# Patient Record
Sex: FEMALE | Race: OTHER | HISPANIC OR LATINO | Employment: UNEMPLOYED | URBAN - METROPOLITAN AREA
[De-identification: names, ages, dates, MRNs, and addresses within clinical notes are randomized per-mention and may not be internally consistent; named-entity substitution may affect disease eponyms.]

---

## 2021-09-16 ENCOUNTER — OFFICE VISIT (OUTPATIENT)
Dept: FAMILY MEDICINE | Facility: CLINIC | Age: 36
End: 2021-09-16
Payer: OTHER GOVERNMENT

## 2021-09-16 ENCOUNTER — TELEPHONE (OUTPATIENT)
Dept: FAMILY MEDICINE | Facility: CLINIC | Age: 36
End: 2021-09-16

## 2021-09-16 VITALS
BODY MASS INDEX: 23.31 KG/M2 | WEIGHT: 145.06 LBS | HEART RATE: 70 BPM | TEMPERATURE: 98 F | HEIGHT: 66 IN | SYSTOLIC BLOOD PRESSURE: 118 MMHG | OXYGEN SATURATION: 95 % | RESPIRATION RATE: 18 BRPM | DIASTOLIC BLOOD PRESSURE: 72 MMHG

## 2021-09-16 DIAGNOSIS — N96 RECURRENT PREGNANCY LOSS: Primary | ICD-10-CM

## 2021-09-16 DIAGNOSIS — Z31.41 FERTILITY TESTING: ICD-10-CM

## 2021-09-16 PROCEDURE — 99999 PR PBB SHADOW E&M-NEW PATIENT-LVL IV: CPT | Mod: PBBFAC,,, | Performed by: FAMILY MEDICINE

## 2021-09-16 PROCEDURE — 99204 OFFICE O/P NEW MOD 45 MIN: CPT | Mod: PBBFAC,PN | Performed by: FAMILY MEDICINE

## 2021-09-16 PROCEDURE — 99203 OFFICE O/P NEW LOW 30 MIN: CPT | Mod: S$PBB,,, | Performed by: FAMILY MEDICINE

## 2021-09-16 PROCEDURE — 99999 PR PBB SHADOW E&M-NEW PATIENT-LVL IV: ICD-10-PCS | Mod: PBBFAC,,, | Performed by: FAMILY MEDICINE

## 2021-09-16 PROCEDURE — 99203 PR OFFICE/OUTPT VISIT, NEW, LEVL III, 30-44 MIN: ICD-10-PCS | Mod: S$PBB,,, | Performed by: FAMILY MEDICINE

## 2021-10-14 ENCOUNTER — OFFICE VISIT (OUTPATIENT)
Dept: FAMILY MEDICINE | Facility: CLINIC | Age: 36
End: 2021-10-14
Payer: OTHER GOVERNMENT

## 2021-10-14 VITALS
TEMPERATURE: 98 F | BODY MASS INDEX: 23.2 KG/M2 | DIASTOLIC BLOOD PRESSURE: 62 MMHG | SYSTOLIC BLOOD PRESSURE: 112 MMHG | OXYGEN SATURATION: 96 % | WEIGHT: 144.38 LBS | RESPIRATION RATE: 18 BRPM | HEIGHT: 66 IN | HEART RATE: 63 BPM

## 2021-10-14 DIAGNOSIS — Z31.83 IN VITRO FERTILIZATION: ICD-10-CM

## 2021-10-14 DIAGNOSIS — Z13.220 ENCOUNTER FOR LIPID SCREENING FOR CARDIOVASCULAR DISEASE: ICD-10-CM

## 2021-10-14 DIAGNOSIS — Z11.4 ENCOUNTER FOR SCREENING FOR HIV: ICD-10-CM

## 2021-10-14 DIAGNOSIS — Z11.59 NEED FOR HEPATITIS C SCREENING TEST: ICD-10-CM

## 2021-10-14 DIAGNOSIS — Z13.6 ENCOUNTER FOR LIPID SCREENING FOR CARDIOVASCULAR DISEASE: ICD-10-CM

## 2021-10-14 DIAGNOSIS — N97.8 FEMALE INFERTILITY OF OTHER ORIGIN: Primary | ICD-10-CM

## 2021-10-14 PROCEDURE — 99213 OFFICE O/P EST LOW 20 MIN: CPT | Mod: S$PBB,,, | Performed by: FAMILY MEDICINE

## 2021-10-14 PROCEDURE — 99999 PR PBB SHADOW E&M-EST. PATIENT-LVL III: ICD-10-PCS | Mod: PBBFAC,,, | Performed by: FAMILY MEDICINE

## 2021-10-14 PROCEDURE — 99213 PR OFFICE/OUTPT VISIT, EST, LEVL III, 20-29 MIN: ICD-10-PCS | Mod: S$PBB,,, | Performed by: FAMILY MEDICINE

## 2021-10-14 PROCEDURE — 99999 PR PBB SHADOW E&M-EST. PATIENT-LVL III: CPT | Mod: PBBFAC,,, | Performed by: FAMILY MEDICINE

## 2021-10-14 PROCEDURE — 99213 OFFICE O/P EST LOW 20 MIN: CPT | Mod: PBBFAC,PN | Performed by: FAMILY MEDICINE

## 2021-10-17 RX ORDER — DESOGESTREL AND ETHINYL ESTRADIOL 0.15-0.03
1 KIT ORAL DAILY
COMMUNITY
Start: 2021-07-27 | End: 2023-01-30

## 2021-10-17 RX ORDER — LEUPROLIDE ACETATE 1 MG/0.2ML
KIT SUBCUTANEOUS
COMMUNITY
Start: 2021-08-17 | End: 2023-01-30

## 2021-10-17 RX ORDER — CABERGOLINE 0.5 MG/1
0.5 TABLET ORAL NIGHTLY
COMMUNITY
Start: 2021-08-30 | End: 2023-01-30

## 2021-10-17 RX ORDER — CETRORELIX ACETATE 0.25 MG
KIT SUBCUTANEOUS
COMMUNITY
Start: 2021-08-17 | End: 2023-01-30

## 2021-10-17 RX ORDER — CHORIONIC GONADOTROPIN 10000 UNIT
KIT INTRAMUSCULAR
COMMUNITY
Start: 2021-08-17 | End: 2023-01-30

## 2021-10-17 RX ORDER — METFORMIN HYDROCHLORIDE 500 MG/1
500 TABLET, EXTENDED RELEASE ORAL 3 TIMES DAILY
COMMUNITY
Start: 2021-07-08 | End: 2022-03-15

## 2021-10-18 ENCOUNTER — LAB VISIT (OUTPATIENT)
Dept: LAB | Facility: HOSPITAL | Age: 36
End: 2021-10-18
Attending: FAMILY MEDICINE
Payer: OTHER GOVERNMENT

## 2021-10-18 DIAGNOSIS — N97.8 FEMALE INFERTILITY OF OTHER ORIGIN: ICD-10-CM

## 2021-10-18 DIAGNOSIS — Z13.6 ENCOUNTER FOR LIPID SCREENING FOR CARDIOVASCULAR DISEASE: ICD-10-CM

## 2021-10-18 DIAGNOSIS — Z11.59 NEED FOR HEPATITIS C SCREENING TEST: ICD-10-CM

## 2021-10-18 DIAGNOSIS — Z13.220 ENCOUNTER FOR LIPID SCREENING FOR CARDIOVASCULAR DISEASE: ICD-10-CM

## 2021-10-18 DIAGNOSIS — Z11.4 ENCOUNTER FOR SCREENING FOR HIV: ICD-10-CM

## 2021-10-18 DIAGNOSIS — Z31.83 IN VITRO FERTILIZATION: ICD-10-CM

## 2021-10-18 LAB
ALBUMIN SERPL BCP-MCNC: 3.7 G/DL (ref 3.5–5.2)
ALP SERPL-CCNC: 104 U/L (ref 55–135)
ALT SERPL W/O P-5'-P-CCNC: 43 U/L (ref 10–44)
ANION GAP SERPL CALC-SCNC: 10 MMOL/L (ref 8–16)
AST SERPL-CCNC: 18 U/L (ref 10–40)
BILIRUB SERPL-MCNC: 0.5 MG/DL (ref 0.1–1)
BUN SERPL-MCNC: 6 MG/DL (ref 6–20)
CALCIUM SERPL-MCNC: 9.5 MG/DL (ref 8.7–10.5)
CHLORIDE SERPL-SCNC: 105 MMOL/L (ref 95–110)
CHOLEST SERPL-MCNC: 195 MG/DL (ref 120–199)
CHOLEST/HDLC SERPL: 3.8 {RATIO} (ref 2–5)
CO2 SERPL-SCNC: 25 MMOL/L (ref 23–29)
CREAT SERPL-MCNC: 0.7 MG/DL (ref 0.5–1.4)
EST. GFR  (AFRICAN AMERICAN): >60 ML/MIN/1.73 M^2
EST. GFR  (NON AFRICAN AMERICAN): >60 ML/MIN/1.73 M^2
GLUCOSE SERPL-MCNC: 130 MG/DL (ref 70–110)
HCG INTACT+B SERPL-ACNC: <1.2 MIU/ML
HDLC SERPL-MCNC: 52 MG/DL (ref 40–75)
HDLC SERPL: 26.7 % (ref 20–50)
LDLC SERPL CALC-MCNC: 119.2 MG/DL (ref 63–159)
NONHDLC SERPL-MCNC: 143 MG/DL
POTASSIUM SERPL-SCNC: 3.8 MMOL/L (ref 3.5–5.1)
PROT SERPL-MCNC: 7.3 G/DL (ref 6–8.4)
SODIUM SERPL-SCNC: 140 MMOL/L (ref 136–145)
TRIGL SERPL-MCNC: 119 MG/DL (ref 30–150)

## 2021-10-18 PROCEDURE — 87389 HIV-1 AG W/HIV-1&-2 AB AG IA: CPT | Performed by: FAMILY MEDICINE

## 2021-10-18 PROCEDURE — 80053 COMPREHEN METABOLIC PANEL: CPT | Performed by: FAMILY MEDICINE

## 2021-10-18 PROCEDURE — 36415 COLL VENOUS BLD VENIPUNCTURE: CPT | Mod: PN | Performed by: FAMILY MEDICINE

## 2021-10-18 PROCEDURE — 86803 HEPATITIS C AB TEST: CPT | Performed by: FAMILY MEDICINE

## 2021-10-18 PROCEDURE — 84144 ASSAY OF PROGESTERONE: CPT | Performed by: FAMILY MEDICINE

## 2021-10-18 PROCEDURE — 84702 CHORIONIC GONADOTROPIN TEST: CPT | Performed by: FAMILY MEDICINE

## 2021-10-18 PROCEDURE — 80061 LIPID PANEL: CPT | Performed by: FAMILY MEDICINE

## 2021-10-19 LAB
HCV AB SERPL QL IA: NEGATIVE
HIV 1+2 AB+HIV1 P24 AG SERPL QL IA: NEGATIVE
PROGEST SERPL-MCNC: 0.2 NG/ML

## 2021-10-20 ENCOUNTER — CLINICAL SUPPORT (OUTPATIENT)
Dept: URGENT CARE | Facility: CLINIC | Age: 36
End: 2021-10-20
Payer: OTHER GOVERNMENT

## 2021-10-20 DIAGNOSIS — Z11.9 ENCOUNTER FOR SCREENING EXAMINATION FOR INFECTIOUS DISEASE: Primary | ICD-10-CM

## 2021-10-20 LAB
CTP QC/QA: YES
SARS-COV-2 RDRP RESP QL NAA+PROBE: NEGATIVE

## 2021-10-20 PROCEDURE — 99211 PR OFFICE/OUTPT VISIT, EST, LEVL I: ICD-10-PCS | Mod: S$GLB,CS,, | Performed by: FAMILY MEDICINE

## 2021-10-20 PROCEDURE — U0002 COVID-19 LAB TEST NON-CDC: HCPCS | Mod: QW,S$GLB,, | Performed by: FAMILY MEDICINE

## 2021-10-20 PROCEDURE — 99211 OFF/OP EST MAY X REQ PHY/QHP: CPT | Mod: S$GLB,CS,, | Performed by: FAMILY MEDICINE

## 2021-10-20 PROCEDURE — U0002: ICD-10-PCS | Mod: QW,S$GLB,, | Performed by: FAMILY MEDICINE

## 2021-12-15 ENCOUNTER — LAB VISIT (OUTPATIENT)
Dept: LAB | Facility: HOSPITAL | Age: 36
End: 2021-12-15
Attending: NURSE PRACTITIONER
Payer: OTHER GOVERNMENT

## 2021-12-15 ENCOUNTER — OFFICE VISIT (OUTPATIENT)
Dept: FAMILY MEDICINE | Facility: CLINIC | Age: 36
End: 2021-12-15
Payer: OTHER GOVERNMENT

## 2021-12-15 VITALS
RESPIRATION RATE: 17 BRPM | SYSTOLIC BLOOD PRESSURE: 110 MMHG | BODY MASS INDEX: 24.27 KG/M2 | TEMPERATURE: 98 F | HEART RATE: 78 BPM | HEIGHT: 66 IN | OXYGEN SATURATION: 96 % | WEIGHT: 151 LBS | DIASTOLIC BLOOD PRESSURE: 76 MMHG

## 2021-12-15 DIAGNOSIS — Z31.83 IN VITRO FERTILIZATION: ICD-10-CM

## 2021-12-15 DIAGNOSIS — N97.8 FEMALE INFERTILITY OF OTHER ORIGIN: ICD-10-CM

## 2021-12-15 DIAGNOSIS — Z23 NEEDS FLU SHOT: ICD-10-CM

## 2021-12-15 DIAGNOSIS — N97.8 FEMALE INFERTILITY OF OTHER ORIGIN: Primary | ICD-10-CM

## 2021-12-15 LAB
HCG INTACT+B SERPL-ACNC: <1.2 MIU/ML
PROGEST SERPL-MCNC: 0.2 NG/ML

## 2021-12-15 PROCEDURE — 99213 OFFICE O/P EST LOW 20 MIN: CPT | Mod: 25,PBBFAC,PN | Performed by: NURSE PRACTITIONER

## 2021-12-15 PROCEDURE — 36415 COLL VENOUS BLD VENIPUNCTURE: CPT | Mod: PN | Performed by: NURSE PRACTITIONER

## 2021-12-15 PROCEDURE — 99213 OFFICE O/P EST LOW 20 MIN: CPT | Mod: S$PBB,,, | Performed by: NURSE PRACTITIONER

## 2021-12-15 PROCEDURE — 84702 CHORIONIC GONADOTROPIN TEST: CPT | Performed by: NURSE PRACTITIONER

## 2021-12-15 PROCEDURE — 99999 PR PBB SHADOW E&M-EST. PATIENT-LVL III: CPT | Mod: PBBFAC,,, | Performed by: NURSE PRACTITIONER

## 2021-12-15 PROCEDURE — 99999 PR PBB SHADOW E&M-EST. PATIENT-LVL III: ICD-10-PCS | Mod: PBBFAC,,, | Performed by: NURSE PRACTITIONER

## 2021-12-15 PROCEDURE — 90471 IMMUNIZATION ADMIN: CPT | Mod: PBBFAC,PN

## 2021-12-15 PROCEDURE — 99213 PR OFFICE/OUTPT VISIT, EST, LEVL III, 20-29 MIN: ICD-10-PCS | Mod: S$PBB,,, | Performed by: NURSE PRACTITIONER

## 2021-12-15 PROCEDURE — 84144 ASSAY OF PROGESTERONE: CPT | Performed by: NURSE PRACTITIONER

## 2021-12-17 ENCOUNTER — OFFICE VISIT (OUTPATIENT)
Dept: OBSTETRICS AND GYNECOLOGY | Facility: CLINIC | Age: 36
End: 2021-12-17
Payer: OTHER GOVERNMENT

## 2021-12-17 ENCOUNTER — PATIENT OUTREACH (OUTPATIENT)
Dept: ADMINISTRATIVE | Facility: OTHER | Age: 36
End: 2021-12-17
Payer: OTHER GOVERNMENT

## 2021-12-17 VITALS
SYSTOLIC BLOOD PRESSURE: 112 MMHG | BODY MASS INDEX: 24.48 KG/M2 | WEIGHT: 152.31 LBS | HEIGHT: 66 IN | DIASTOLIC BLOOD PRESSURE: 72 MMHG

## 2021-12-17 DIAGNOSIS — N97.9 FEMALE INFERTILITY: Primary | ICD-10-CM

## 2021-12-17 PROCEDURE — 99999 PR PBB SHADOW E&M-EST. PATIENT-LVL III: CPT | Mod: PBBFAC,,, | Performed by: OBSTETRICS & GYNECOLOGY

## 2021-12-17 PROCEDURE — 99202 PR OFFICE/OUTPT VISIT, NEW, LEVL II, 15-29 MIN: ICD-10-PCS | Mod: S$PBB,,, | Performed by: OBSTETRICS & GYNECOLOGY

## 2021-12-17 PROCEDURE — 99999 PR PBB SHADOW E&M-EST. PATIENT-LVL III: ICD-10-PCS | Mod: PBBFAC,,, | Performed by: OBSTETRICS & GYNECOLOGY

## 2021-12-17 PROCEDURE — 99213 OFFICE O/P EST LOW 20 MIN: CPT | Mod: PBBFAC | Performed by: OBSTETRICS & GYNECOLOGY

## 2021-12-17 PROCEDURE — 99202 OFFICE O/P NEW SF 15 MIN: CPT | Mod: S$PBB,,, | Performed by: OBSTETRICS & GYNECOLOGY

## 2021-12-29 ENCOUNTER — HOSPITAL ENCOUNTER (OUTPATIENT)
Dept: RADIOLOGY | Facility: HOSPITAL | Age: 36
Discharge: HOME | End: 2021-12-29
Attending: OBSTETRICS & GYNECOLOGY
Payer: OTHER GOVERNMENT

## 2021-12-29 DIAGNOSIS — N97.9 FEMALE INFERTILITY: ICD-10-CM

## 2021-12-29 PROCEDURE — 76856 US EXAM PELVIC COMPLETE: CPT | Mod: TC

## 2021-12-29 PROCEDURE — 76830 US PELVIS COMP WITH TRANSVAG NON-OB (XPD): ICD-10-PCS | Mod: 26,,, | Performed by: RADIOLOGY

## 2021-12-29 PROCEDURE — 76830 TRANSVAGINAL US NON-OB: CPT | Mod: 26,,, | Performed by: RADIOLOGY

## 2021-12-29 PROCEDURE — 76856 US EXAM PELVIC COMPLETE: CPT | Mod: 26,,, | Performed by: RADIOLOGY

## 2021-12-29 PROCEDURE — 76856 US PELVIS COMP WITH TRANSVAG NON-OB (XPD): ICD-10-PCS | Mod: 26,,, | Performed by: RADIOLOGY

## 2021-12-30 ENCOUNTER — PATIENT MESSAGE (OUTPATIENT)
Dept: OBSTETRICS AND GYNECOLOGY | Facility: CLINIC | Age: 36
End: 2021-12-30
Payer: OTHER GOVERNMENT

## 2022-01-03 ENCOUNTER — TELEPHONE (OUTPATIENT)
Dept: OBSTETRICS AND GYNECOLOGY | Facility: CLINIC | Age: 37
End: 2022-01-03
Payer: OTHER GOVERNMENT

## 2022-01-03 DIAGNOSIS — N97.9 FEMALE INFERTILITY: Primary | ICD-10-CM

## 2022-01-07 ENCOUNTER — HOSPITAL ENCOUNTER (OUTPATIENT)
Dept: RADIOLOGY | Facility: HOSPITAL | Age: 37
Discharge: HOME | End: 2022-01-07
Attending: OBSTETRICS & GYNECOLOGY
Payer: OTHER GOVERNMENT

## 2022-01-07 DIAGNOSIS — N97.9 FEMALE INFERTILITY: ICD-10-CM

## 2022-01-07 PROCEDURE — 76830 US PELVIS COMP WITH TRANSVAG NON-OB (XPD): ICD-10-PCS | Mod: 26,,, | Performed by: RADIOLOGY

## 2022-01-07 PROCEDURE — 76856 US PELVIS COMP WITH TRANSVAG NON-OB (XPD): ICD-10-PCS | Mod: 26,,, | Performed by: RADIOLOGY

## 2022-01-07 PROCEDURE — 76830 TRANSVAGINAL US NON-OB: CPT | Mod: 26,,, | Performed by: RADIOLOGY

## 2022-01-07 PROCEDURE — 76856 US EXAM PELVIC COMPLETE: CPT | Mod: 26,,, | Performed by: RADIOLOGY

## 2022-01-07 PROCEDURE — 76830 TRANSVAGINAL US NON-OB: CPT | Mod: TC

## 2022-01-10 ENCOUNTER — TELEPHONE (OUTPATIENT)
Dept: OBSTETRICS AND GYNECOLOGY | Facility: CLINIC | Age: 37
End: 2022-01-10
Payer: OTHER GOVERNMENT

## 2022-01-10 DIAGNOSIS — N97.9 FEMALE INFERTILITY: Primary | ICD-10-CM

## 2022-01-20 ENCOUNTER — HOSPITAL ENCOUNTER (OUTPATIENT)
Dept: RADIOLOGY | Facility: HOSPITAL | Age: 37
Discharge: HOME | End: 2022-01-20
Attending: OBSTETRICS & GYNECOLOGY
Payer: OTHER GOVERNMENT

## 2022-01-20 DIAGNOSIS — N97.9 FEMALE INFERTILITY: ICD-10-CM

## 2022-01-20 PROCEDURE — 76856 US EXAM PELVIC COMPLETE: CPT | Mod: 26,,, | Performed by: RADIOLOGY

## 2022-01-20 PROCEDURE — 76830 TRANSVAGINAL US NON-OB: CPT | Mod: 26,,, | Performed by: RADIOLOGY

## 2022-01-20 PROCEDURE — 76830 TRANSVAGINAL US NON-OB: CPT | Mod: TC

## 2022-01-20 PROCEDURE — 76856 US PELVIS COMP WITH TRANSVAG NON-OB (XPD): ICD-10-PCS | Mod: 26,,, | Performed by: RADIOLOGY

## 2022-01-20 PROCEDURE — 76830 US PELVIS COMP WITH TRANSVAG NON-OB (XPD): ICD-10-PCS | Mod: 26,,, | Performed by: RADIOLOGY

## 2022-01-28 ENCOUNTER — TELEPHONE (OUTPATIENT)
Dept: OBSTETRICS AND GYNECOLOGY | Facility: CLINIC | Age: 37
End: 2022-01-28
Payer: OTHER GOVERNMENT

## 2022-01-28 DIAGNOSIS — N97.9 FEMALE INFERTILITY: Primary | ICD-10-CM

## 2022-02-01 ENCOUNTER — HOSPITAL ENCOUNTER (OUTPATIENT)
Dept: RADIOLOGY | Facility: HOSPITAL | Age: 37
Discharge: HOME | End: 2022-02-01
Attending: OBSTETRICS & GYNECOLOGY
Payer: OTHER GOVERNMENT

## 2022-02-01 DIAGNOSIS — N97.9 FEMALE INFERTILITY: ICD-10-CM

## 2022-02-01 PROCEDURE — 76830 TRANSVAGINAL US NON-OB: CPT | Mod: 26,,, | Performed by: RADIOLOGY

## 2022-02-01 PROCEDURE — 76830 TRANSVAGINAL US NON-OB: CPT | Mod: TC

## 2022-02-01 PROCEDURE — 76830 US PELVIS COMP WITH TRANSVAG NON-OB (XPD): ICD-10-PCS | Mod: 26,,, | Performed by: RADIOLOGY

## 2022-02-01 PROCEDURE — 76856 US EXAM PELVIC COMPLETE: CPT | Mod: 26,,, | Performed by: RADIOLOGY

## 2022-02-01 PROCEDURE — 76856 US PELVIS COMP WITH TRANSVAG NON-OB (XPD): ICD-10-PCS | Mod: 26,,, | Performed by: RADIOLOGY

## 2022-02-03 ENCOUNTER — TELEPHONE (OUTPATIENT)
Dept: OBSTETRICS AND GYNECOLOGY | Facility: HOSPITAL | Age: 37
End: 2022-02-03
Payer: OTHER GOVERNMENT

## 2022-02-03 DIAGNOSIS — N97.9 FEMALE INFERTILITY: Primary | ICD-10-CM

## 2022-02-03 NOTE — TELEPHONE ENCOUNTER
----- Message from Maria De Jesus Jesus MA sent at 2/3/2022  8:54 AM CST -----  Pt needs one more scan, she is sched for Monday. Please submit orders.  ----- Message -----  From: Mildred Scott  Sent: 2/3/2022   8:34 AM CST  To: [facility] Staff    Type: Patient Call Back       What is the request in detail:  pt calling to speak to a nurse regarding status on scan.      Can the clinic reply by [facility]? No       Would the patient rather a call back or a response via [facility]? Call back       Best call back number: [phone]        Thank you.

## 2022-02-07 ENCOUNTER — HOSPITAL ENCOUNTER (OUTPATIENT)
Dept: RADIOLOGY | Facility: OTHER | Age: 37
Discharge: HOME | End: 2022-02-07
Attending: OBSTETRICS & GYNECOLOGY
Payer: OTHER GOVERNMENT

## 2022-02-07 DIAGNOSIS — N97.9 FEMALE INFERTILITY: ICD-10-CM

## 2022-02-07 PROCEDURE — 76830 TRANSVAGINAL US NON-OB: CPT | Mod: TC

## 2022-02-07 PROCEDURE — 76856 US EXAM PELVIC COMPLETE: CPT | Mod: 26,,, | Performed by: STUDENT IN AN ORGANIZED HEALTH CARE EDUCATION/TRAINING PROGRAM

## 2022-02-07 PROCEDURE — 76830 TRANSVAGINAL US NON-OB: CPT | Mod: 26,,, | Performed by: STUDENT IN AN ORGANIZED HEALTH CARE EDUCATION/TRAINING PROGRAM

## 2022-02-07 PROCEDURE — 76830 US PELVIS COMP WITH TRANSVAG NON-OB (XPD): ICD-10-PCS | Mod: 26,,, | Performed by: STUDENT IN AN ORGANIZED HEALTH CARE EDUCATION/TRAINING PROGRAM

## 2022-02-07 PROCEDURE — 76856 US PELVIS COMP WITH TRANSVAG NON-OB (XPD): ICD-10-PCS | Mod: 26,,, | Performed by: STUDENT IN AN ORGANIZED HEALTH CARE EDUCATION/TRAINING PROGRAM

## 2022-02-08 ENCOUNTER — TELEPHONE (OUTPATIENT)
Dept: OBSTETRICS AND GYNECOLOGY | Facility: CLINIC | Age: 37
End: 2022-02-08
Payer: OTHER GOVERNMENT

## 2022-02-08 ENCOUNTER — LAB VISIT (OUTPATIENT)
Dept: LAB | Facility: HOSPITAL | Age: 37
End: 2022-02-08
Attending: OBSTETRICS & GYNECOLOGY
Payer: OTHER GOVERNMENT

## 2022-02-08 DIAGNOSIS — N97.9 FEMALE INFERTILITY: ICD-10-CM

## 2022-02-08 DIAGNOSIS — N97.9 FEMALE INFERTILITY: Primary | ICD-10-CM

## 2022-02-08 PROCEDURE — 84144 ASSAY OF PROGESTERONE: CPT | Performed by: OBSTETRICS & GYNECOLOGY

## 2022-02-08 PROCEDURE — 36415 COLL VENOUS BLD VENIPUNCTURE: CPT | Performed by: OBSTETRICS & GYNECOLOGY

## 2022-02-09 LAB — PROGEST SERPL-MCNC: 0.2 NG/ML

## 2022-02-16 ENCOUNTER — TELEPHONE (OUTPATIENT)
Dept: OBSTETRICS AND GYNECOLOGY | Facility: HOSPITAL | Age: 37
End: 2022-02-16
Payer: OTHER GOVERNMENT

## 2022-02-16 ENCOUNTER — PATIENT MESSAGE (OUTPATIENT)
Dept: OBSTETRICS AND GYNECOLOGY | Facility: CLINIC | Age: 37
End: 2022-02-16
Payer: OTHER GOVERNMENT

## 2022-02-16 DIAGNOSIS — Z32.00 POSSIBLE PREGNANCY: Primary | ICD-10-CM

## 2022-02-16 DIAGNOSIS — N97.9 FEMALE INFERTILITY: Primary | ICD-10-CM

## 2022-02-23 ENCOUNTER — LAB VISIT (OUTPATIENT)
Dept: LAB | Facility: HOSPITAL | Age: 37
End: 2022-02-23
Attending: OBSTETRICS & GYNECOLOGY
Payer: OTHER GOVERNMENT

## 2022-02-23 DIAGNOSIS — N97.9 FEMALE INFERTILITY: ICD-10-CM

## 2022-02-23 DIAGNOSIS — Z32.00 POSSIBLE PREGNANCY: ICD-10-CM

## 2022-02-23 LAB
ESTRADIOL SERPL-MCNC: 2534 PG/ML
HCG INTACT+B SERPL-ACNC: <1.2 MIU/ML
PROGEST SERPL-MCNC: 13.8 NG/ML

## 2022-02-23 PROCEDURE — 84702 CHORIONIC GONADOTROPIN TEST: CPT | Performed by: OBSTETRICS & GYNECOLOGY

## 2022-02-23 PROCEDURE — 84144 ASSAY OF PROGESTERONE: CPT | Performed by: OBSTETRICS & GYNECOLOGY

## 2022-02-23 PROCEDURE — 82670 ASSAY OF TOTAL ESTRADIOL: CPT | Performed by: OBSTETRICS & GYNECOLOGY

## 2022-02-23 PROCEDURE — 36415 COLL VENOUS BLD VENIPUNCTURE: CPT | Performed by: OBSTETRICS & GYNECOLOGY

## 2022-02-24 ENCOUNTER — PATIENT MESSAGE (OUTPATIENT)
Dept: OBSTETRICS AND GYNECOLOGY | Facility: CLINIC | Age: 37
End: 2022-02-24
Payer: OTHER GOVERNMENT

## 2022-03-14 ENCOUNTER — PATIENT OUTREACH (OUTPATIENT)
Dept: ADMINISTRATIVE | Facility: OTHER | Age: 37
End: 2022-03-14
Payer: OTHER GOVERNMENT

## 2022-03-15 ENCOUNTER — OFFICE VISIT (OUTPATIENT)
Dept: OBSTETRICS AND GYNECOLOGY | Facility: CLINIC | Age: 37
End: 2022-03-15
Payer: OTHER GOVERNMENT

## 2022-03-15 VITALS — WEIGHT: 144.5 LBS | BODY MASS INDEX: 23.32 KG/M2 | DIASTOLIC BLOOD PRESSURE: 74 MMHG | SYSTOLIC BLOOD PRESSURE: 118 MMHG

## 2022-03-15 DIAGNOSIS — E28.2 PCO (POLYCYSTIC OVARIES): Primary | ICD-10-CM

## 2022-03-15 DIAGNOSIS — N96 HISTORY OF MULTIPLE MISCARRIAGES: ICD-10-CM

## 2022-03-15 PROCEDURE — 99213 OFFICE O/P EST LOW 20 MIN: CPT | Mod: S$PBB,,, | Performed by: OBSTETRICS & GYNECOLOGY

## 2022-03-15 PROCEDURE — 99999 PR PBB SHADOW E&M-EST. PATIENT-LVL II: ICD-10-PCS | Mod: PBBFAC,,, | Performed by: OBSTETRICS & GYNECOLOGY

## 2022-03-15 PROCEDURE — 99213 PR OFFICE/OUTPT VISIT, EST, LEVL III, 20-29 MIN: ICD-10-PCS | Mod: S$PBB,,, | Performed by: OBSTETRICS & GYNECOLOGY

## 2022-03-15 PROCEDURE — 99999 PR PBB SHADOW E&M-EST. PATIENT-LVL II: CPT | Mod: PBBFAC,,, | Performed by: OBSTETRICS & GYNECOLOGY

## 2022-03-15 PROCEDURE — 99212 OFFICE O/P EST SF 10 MIN: CPT | Mod: PBBFAC | Performed by: OBSTETRICS & GYNECOLOGY

## 2022-03-15 RX ORDER — METFORMIN HYDROCHLORIDE 500 MG/1
1000 TABLET, EXTENDED RELEASE ORAL
Qty: 180 TABLET | Refills: 3 | Status: SHIPPED | OUTPATIENT
Start: 2022-03-15 | End: 2023-03-20 | Stop reason: SDUPTHER

## 2022-03-15 RX ORDER — LETROZOLE 2.5 MG/1
2.5 TABLET, FILM COATED ORAL DAILY
Qty: 5 TABLET | Refills: 3 | Status: SHIPPED | OUTPATIENT
Start: 2022-03-15 | End: 2022-12-29 | Stop reason: SDUPTHER

## 2022-03-19 NOTE — PROGRESS NOTES
Cc:  Discuss fertility    HPI:  36 yr  who recently underwent failed IVF in [place] presents to discuss other fertility options.  Pt has hx of irregular menses consistent with PCO.  Pt has one pregnancy to to approximately 22 wks, and the others were all first trimester SABs.  Pt is requesting to discuss either clomid or femara use.    Pt is not currently keeping menstrual calendar.  And for last several months, pt has been under care of [facility] in [place].    Vitals:    03/15/22 1603   BP: 118/74   Weight: 65.5 kg (144 lb 8.2 oz)     LMP 3/4/22    Physical Exam  Vitals reviewed.   Constitutional:       General: She is not in acute distress.     Appearance: She is normal weight. She is not ill-appearing, toxic-appearing or diaphoretic.   HENT:      Head: Normocephalic and atraumatic.   Pulmonary:      Effort: Pulmonary effort is normal. No respiratory distress.   Abdominal:      General: Abdomen is flat. There is no distension.      Palpations: There is no mass.   Musculoskeletal:         General: No swelling, tenderness, deformity or signs of injury. Normal range of motion.   Neurological:      General: No focal deficit present.      Mental Status: She is alert.      Cranial Nerves: No cranial nerve deficit.      Sensory: No sensory deficit.      Motor: No weakness.      Coordination: Coordination normal.   Psychiatric:         Mood and Affect: Mood normal.         Behavior: Behavior normal.         Thought Content: Thought content normal.           30 minute discussion  Discussed need to keep menstrual calendar.  If pt does not have spontaneous menses by 22, pt to call office for progesterone withdrawal bleed.  Discussed Femara is to be used CD3-7 after onset of next menses.  If pt is able to achieve pregnacy, pt would be started on progesterone and possibly lovenox.  Pt will also be restarted on metformin  mg qd.    Assessment/Plan  1. PCO (polycystic ovaries)  letrozole (FEMARA) 2.5 mg Tab    metFORMIN  (GLUCOPHAGE-XR) 500 MG ER 24hr tablet   2. History of multiple miscarriages

## 2022-04-12 ENCOUNTER — TELEPHONE (OUTPATIENT)
Dept: OBSTETRICS AND GYNECOLOGY | Facility: CLINIC | Age: 37
End: 2022-04-12
Payer: OTHER GOVERNMENT

## 2022-04-18 ENCOUNTER — TELEPHONE (OUTPATIENT)
Dept: OBSTETRICS AND GYNECOLOGY | Facility: CLINIC | Age: 37
End: 2022-04-18
Payer: OTHER GOVERNMENT

## 2022-04-18 DIAGNOSIS — N91.0 DELAYED MENSES: Primary | ICD-10-CM

## 2022-04-18 RX ORDER — MEDROXYPROGESTERONE ACETATE 10 MG/1
10 TABLET ORAL DAILY
Qty: 10 TABLET | Refills: 0 | Status: SHIPPED | OUTPATIENT
Start: 2022-04-18 | End: 2022-06-28 | Stop reason: SDUPTHER

## 2022-04-24 ENCOUNTER — PATIENT MESSAGE (OUTPATIENT)
Dept: OBSTETRICS AND GYNECOLOGY | Facility: CLINIC | Age: 37
End: 2022-04-24
Payer: OTHER GOVERNMENT

## 2022-05-26 ENCOUNTER — TELEPHONE (OUTPATIENT)
Dept: OPHTHALMOLOGY | Facility: CLINIC | Age: 37
End: 2022-05-26
Payer: OTHER GOVERNMENT

## 2022-05-26 NOTE — TELEPHONE ENCOUNTER
Left message to schedule appt  ----- Message from Isa Madrid sent at 5/26/2022  4:41 PM CDT -----  Contact: Juan@  [phone]  Pt is having some eye irritation and redness and is requesting to be seen in the clinic. She is a NP

## 2022-05-30 ENCOUNTER — TELEPHONE (OUTPATIENT)
Dept: OPTOMETRY | Facility: CLINIC | Age: 37
End: 2022-05-30
Payer: OTHER GOVERNMENT

## 2022-05-30 NOTE — TELEPHONE ENCOUNTER
Spoke with pt spouse appt has been schedule for 6/6/22 at 3:30 PM. Pt declined sooner appt since she started her new job.

## 2022-05-31 ENCOUNTER — PATIENT MESSAGE (OUTPATIENT)
Dept: ADMINISTRATIVE | Facility: HOSPITAL | Age: 37
End: 2022-05-31
Payer: OTHER GOVERNMENT

## 2022-06-10 ENCOUNTER — PATIENT MESSAGE (OUTPATIENT)
Dept: OBSTETRICS AND GYNECOLOGY | Facility: CLINIC | Age: 37
End: 2022-06-10
Payer: OTHER GOVERNMENT

## 2022-06-15 ENCOUNTER — PATIENT MESSAGE (OUTPATIENT)
Dept: OBSTETRICS AND GYNECOLOGY | Facility: CLINIC | Age: 37
End: 2022-06-15
Payer: OTHER GOVERNMENT

## 2022-06-28 ENCOUNTER — TELEPHONE (OUTPATIENT)
Dept: OBSTETRICS AND GYNECOLOGY | Facility: CLINIC | Age: 37
End: 2022-06-28
Payer: OTHER GOVERNMENT

## 2022-06-28 DIAGNOSIS — N91.0 DELAYED MENSES: ICD-10-CM

## 2022-06-28 RX ORDER — MEDROXYPROGESTERONE ACETATE 10 MG/1
10 TABLET ORAL DAILY
Qty: 10 TABLET | Refills: 0 | Status: SHIPPED | OUTPATIENT
Start: 2022-06-28 | End: 2023-05-29

## 2022-08-24 ENCOUNTER — PATIENT MESSAGE (OUTPATIENT)
Dept: ADMINISTRATIVE | Facility: HOSPITAL | Age: 37
End: 2022-08-24
Payer: OTHER GOVERNMENT

## 2022-10-10 ENCOUNTER — PATIENT MESSAGE (OUTPATIENT)
Dept: ADMINISTRATIVE | Facility: HOSPITAL | Age: 37
End: 2022-10-10
Payer: OTHER GOVERNMENT

## 2022-12-25 ENCOUNTER — PATIENT MESSAGE (OUTPATIENT)
Dept: OBSTETRICS AND GYNECOLOGY | Facility: CLINIC | Age: 37
End: 2022-12-25
Payer: OTHER GOVERNMENT

## 2022-12-25 ENCOUNTER — PATIENT MESSAGE (OUTPATIENT)
Dept: FAMILY MEDICINE | Facility: CLINIC | Age: 37
End: 2022-12-25
Payer: OTHER GOVERNMENT

## 2022-12-29 ENCOUNTER — TELEPHONE (OUTPATIENT)
Dept: OBSTETRICS AND GYNECOLOGY | Facility: CLINIC | Age: 37
End: 2022-12-29
Payer: OTHER GOVERNMENT

## 2022-12-29 ENCOUNTER — TELEPHONE (OUTPATIENT)
Dept: FAMILY MEDICINE | Facility: CLINIC | Age: 37
End: 2022-12-29
Payer: OTHER GOVERNMENT

## 2022-12-29 DIAGNOSIS — E28.2 PCO (POLYCYSTIC OVARIES): ICD-10-CM

## 2022-12-29 RX ORDER — LETROZOLE 2.5 MG/1
2.5 TABLET, FILM COATED ORAL DAILY
Qty: 5 TABLET | Refills: 3 | Status: SHIPPED | OUTPATIENT
Start: 2022-12-29 | End: 2023-01-17

## 2022-12-29 NOTE — TELEPHONE ENCOUNTER
Called patient to  1/3 appt. No answer. Left message asking patient to return phone call.    INT#[id number]  Name:Cristobal

## 2022-12-30 ENCOUNTER — TELEPHONE (OUTPATIENT)
Dept: FAMILY MEDICINE | Facility: CLINIC | Age: 37
End: 2022-12-30
Payer: OTHER GOVERNMENT

## 2022-12-30 NOTE — TELEPHONE ENCOUNTER
Called patient to  appt. No answer. Left VM asking patient to call back.    INT#:[id number]  Name:Roque

## 2023-01-11 DIAGNOSIS — E28.2 PCO (POLYCYSTIC OVARIES): ICD-10-CM

## 2023-01-11 RX ORDER — LETROZOLE 2.5 MG/1
TABLET, FILM COATED ORAL
Qty: 5 TABLET | Refills: 3 | OUTPATIENT
Start: 2023-01-11

## 2023-01-11 NOTE — TELEPHONE ENCOUNTER
Received a refill request via fax from pt's pharmacy for pt's Letrozole. Prescription was recently sent on 12/29/2022 with 3 refills. Contacted pharmacy. Pharmacy advised prescription is ready for pickup. No further assistance needed .

## 2023-01-13 ENCOUNTER — OFFICE VISIT (OUTPATIENT)
Dept: PRIMARY CARE CLINIC | Facility: CLINIC | Age: 38
End: 2023-01-13
Payer: OTHER GOVERNMENT

## 2023-01-13 DIAGNOSIS — L21.9 SEBORRHEIC DERMATITIS: Primary | ICD-10-CM

## 2023-01-13 PROCEDURE — 99213 OFFICE O/P EST LOW 20 MIN: CPT | Mod: 95,,, | Performed by: FAMILY MEDICINE

## 2023-01-13 PROCEDURE — 99213 PR OFFICE/OUTPT VISIT, EST, LEVL III, 20-29 MIN: ICD-10-PCS | Mod: 95,,, | Performed by: FAMILY MEDICINE

## 2023-01-13 RX ORDER — KETOCONAZOLE 20 MG/ML
SHAMPOO, SUSPENSION TOPICAL
Qty: 120 ML | Refills: 11 | Status: ON HOLD | OUTPATIENT
Start: 2023-01-13 | End: 2023-09-25 | Stop reason: HOSPADM

## 2023-01-14 ENCOUNTER — PATIENT MESSAGE (OUTPATIENT)
Dept: OBSTETRICS AND GYNECOLOGY | Facility: CLINIC | Age: 38
End: 2023-01-14
Payer: OTHER GOVERNMENT

## 2023-01-17 ENCOUNTER — TELEPHONE (OUTPATIENT)
Dept: OBSTETRICS AND GYNECOLOGY | Facility: CLINIC | Age: 38
End: 2023-01-17
Payer: OTHER GOVERNMENT

## 2023-01-17 DIAGNOSIS — E28.2 PCO (POLYCYSTIC OVARIES): ICD-10-CM

## 2023-01-17 RX ORDER — LETROZOLE 2.5 MG/1
7.5 TABLET, FILM COATED ORAL DAILY
Qty: 15 TABLET | Refills: 6 | Status: SHIPPED | OUTPATIENT
Start: 2023-01-17 | End: 2023-01-22

## 2023-01-18 ENCOUNTER — PATIENT MESSAGE (OUTPATIENT)
Dept: ADMINISTRATIVE | Facility: HOSPITAL | Age: 38
End: 2023-01-18
Payer: OTHER GOVERNMENT

## 2023-01-23 ENCOUNTER — PATIENT OUTREACH (OUTPATIENT)
Dept: ADMINISTRATIVE | Facility: HOSPITAL | Age: 38
End: 2023-01-23
Payer: OTHER GOVERNMENT

## 2023-01-23 NOTE — LETTER
AUTHORIZATION FOR RELEASE OF   CONFIDENTIAL INFORMATION        We are seeing Janki Hilton, date of birth 1985, in the clinic at [facility]. Enrique Bal Jr, MD is the patient's PCP. Janki Hilton has an outstanding lab/procedure at the time we reviewed her chart. In order to help keep her health information updated, she has authorized us to request the following medical record(s):        (  )  MAMMOGRAM                                      (  )  COLONOSCOPY      ( X )  PAP SMEAR                                          (  )  OUTSIDE LAB RESULTS     (  )  DEXA SCAN                                          (  )  EYE EXAM            (  )  FOOT EXAM                                          (  )  ENTIRE RECORD     (  )  OUTSIDE IMMUNIZATIONS                 (  )  _______________         Please fax records to [facility], Cesar R Roque Jr, MD, FAX [phone]   [address] [facility] [address]      If you have any questions, please contact Carmen Perdue MA,[facility] at [phone].             Patient Name: Janki Hilton  : 1985  Patient Phone #: [phone]

## 2023-01-23 NOTE — PROGRESS NOTES
The patient location is: [place], [place]  The chief complaint leading to consultation is: Dandruff    Visit type: audiovisual    Face to Face time with patient: 8 minutes  11 minutes of total time spent on the encounter, which includes face to face time and non-face to face time preparing to see the patient (eg, review of tests), Obtaining and/or reviewing separately obtained history, Documenting clinical information in the electronic or other health record, Independently interpreting results (not separately reported) and communicating results to the patient/family/caregiver, or Care coordination (not separately reported).         Each patient to whom he or she provides medical services by telemedicine is:  (1) informed of the relationship between the physician and patient and the respective role of any other health care provider with respect to management of the patient; and (2) notified that he or she may decline to receive medical services by telemedicine and may withdraw from such care at any time.    Notes:   Subjective:       Patient ID: Janki Hilton is a 37 y.o. female.    Chief Complaint: Dandruff    Hair/Scalp Problem  This is a chronic problem. The current episode started more than 1 month ago (complains of itchy scalp and flaking). The problem occurs constantly. The problem has been unchanged. Pertinent negatives include no arthralgias, chest pain, headaches, joint swelling, neck pain, vomiting or weakness. Nothing aggravates the symptoms. She has tried nothing for the symptoms.   Review of Systems   Constitutional:  Negative for activity change and unexpected weight change.   HENT:  Negative for hearing loss, rhinorrhea and trouble swallowing.    Eyes:  Negative for discharge and visual disturbance.   Respiratory:  Negative for chest tightness and wheezing.    Cardiovascular:  Negative for chest pain and palpitations.   Gastrointestinal:  Negative for blood in stool, constipation, diarrhea and  vomiting.   Endocrine: Negative for polydipsia and polyuria.   Genitourinary:  Negative for difficulty urinating, dysuria, hematuria and menstrual problem.   Musculoskeletal:  Negative for arthralgias, joint swelling and neck pain.   Neurological:  Negative for weakness and headaches.   Psychiatric/Behavioral:  Negative for confusion and dysphoric mood.        Objective:      Physical Exam  HENT:      Head: Normocephalic and atraumatic.   Pulmonary:      Effort: Pulmonary effort is normal.   Neurological:      Mental Status: She is alert.   Psychiatric:         Mood and Affect: Mood normal.         Behavior: Behavior normal.         Thought Content: Thought content normal.       Assessment:       Problem List Items Addressed This Visit    None  Visit Diagnoses       Seborrheic dermatitis    -  Primary    Relevant Medications    ketoconazole (NIZORAL) 2 % shampoo              Plan:       Janki was seen today for dandruff.    Diagnoses and all orders for this visit:    Seborrheic dermatitis  -     ketoconazole (NIZORAL) 2 % shampoo; Apply to scalp, lather and leave on for 10 minutes, then rinse. Apply 3 times a week    Course of Nizoral as prescribed  Patient was able to explain back how to use.

## 2023-01-25 ENCOUNTER — PATIENT OUTREACH (OUTPATIENT)
Dept: ADMINISTRATIVE | Facility: HOSPITAL | Age: 38
End: 2023-01-25
Payer: OTHER GOVERNMENT

## 2023-01-30 ENCOUNTER — OFFICE VISIT (OUTPATIENT)
Dept: FAMILY MEDICINE | Facility: CLINIC | Age: 38
End: 2023-01-30
Payer: OTHER GOVERNMENT

## 2023-01-30 DIAGNOSIS — H11.001 PTERYGIUM, RIGHT: ICD-10-CM

## 2023-01-30 DIAGNOSIS — H10.31 ACUTE CONJUNCTIVITIS OF RIGHT EYE, UNSPECIFIED ACUTE CONJUNCTIVITIS TYPE: Primary | ICD-10-CM

## 2023-01-30 PROCEDURE — 99213 PR OFFICE/OUTPT VISIT, EST, LEVL III, 20-29 MIN: ICD-10-PCS | Mod: 95,,, | Performed by: FAMILY MEDICINE

## 2023-01-30 PROCEDURE — 99213 OFFICE O/P EST LOW 20 MIN: CPT | Mod: 95,,, | Performed by: FAMILY MEDICINE

## 2023-01-30 RX ORDER — KETOROLAC TROMETHAMINE 5 MG/ML
1 SOLUTION OPHTHALMIC 4 TIMES DAILY
Qty: 3 ML | Refills: 0 | Status: SHIPPED | OUTPATIENT
Start: 2023-01-30 | End: 2023-02-09

## 2023-01-30 NOTE — PROGRESS NOTES
The patient location is: on [place], [place]  The chief complaint leading to consultation is: eye concern    Visit type: audiovisual    Face to Face time with patient: 14 minutes  16 minutes of total time spent on the encounter, which includes face to face time and non-face to face time preparing to see the patient (eg, review of tests), Obtaining and/or reviewing separately obtained history, Documenting clinical information in the electronic or other health record, Independently interpreting results (not separately reported) and communicating results to the patient/family/caregiver, or Care coordination (not separately reported).         Each patient to whom he or she provides medical services by telemedicine is:  (1) informed of the relationship between the physician and patient and the respective role of any other health care provider with respect to management of the patient; and (2) notified that he or she may decline to receive medical services by telemedicine and may withdraw from such care at any time.    Notes:      Subjective:       Patient ID: Janki Hilton is a 37 y.o. female.    Chief Complaint: Eye Problem    Eye Problem   The right eye is affected. This is a recurrent problem. The current episode started more than 1 month ago. The problem occurs intermittently. There was no injury mechanism. There is No known exposure to pink eye. She Does not wear contacts. Associated symptoms include blurred vision (only when having a flair up of what is happening), eye redness and a foreign body sensation. Pertinent negatives include no eye discharge, double vision, fever, nausea, photophobia, recent URI, vomiting or weakness. Associated symptoms comments: States she has a tissue like growth on her iris from the nasal side going towards the pupil. States she feels that at times it appears bigger. . Treatments tried: Went to an outside eye doctor, but states she wasn't given much information or follow  up.   Review of Systems   Constitutional:  Negative for activity change, fever and unexpected weight change.   HENT:  Negative for hearing loss, rhinorrhea and trouble swallowing.    Eyes:  Positive for blurred vision (only when having a flair up of what is happening) and redness. Negative for double vision, photophobia, discharge and visual disturbance.   Respiratory:  Negative for chest tightness and wheezing.    Cardiovascular:  Negative for chest pain and palpitations.   Gastrointestinal:  Negative for blood in stool, constipation, diarrhea, nausea and vomiting.   Endocrine: Negative for polydipsia and polyuria.   Genitourinary:  Negative for difficulty urinating, dysuria, hematuria and menstrual problem.   Musculoskeletal:  Negative for arthralgias, joint swelling and neck pain.   Neurological:  Negative for weakness and headaches.   Psychiatric/Behavioral:  Negative for confusion and dysphoric mood.        Objective:      Physical Exam  Constitutional:       Appearance: She is not ill-appearing.   HENT:      Head: Normocephalic.   Pulmonary:      Effort: Pulmonary effort is normal.   Neurological:      Mental Status: She is alert.   Psychiatric:         Mood and Affect: Mood normal.         Behavior: Behavior normal.         Thought Content: Thought content normal.         Judgment: Judgment normal.       Assessment:       Problem List Items Addressed This Visit    None  Visit Diagnoses       Acute conjunctivitis of right eye, unspecified acute conjunctivitis type    -  Primary    Relevant Medications    ketorolac 0.5% (ACULAR) 0.5 % Drop    Other Relevant Orders    Ambulatory referral/consult to Optometry    Pterygium, right        Relevant Medications    ketorolac 0.5% (ACULAR) 0.5 % Drop    Other Relevant Orders    Ambulatory referral/consult to Optometry            Plan:       Janki was seen today for eye problem.    Diagnoses and all orders for this visit:    Acute conjunctivitis of right eye,  unspecified acute conjunctivitis type  -     Ambulatory referral/consult to Optometry; Future  -     ketorolac 0.5% (ACULAR) 0.5 % Drop; Place 1 drop into the right eye 4 (four) times daily. for 10 days    Pterygium, right  -     Ambulatory referral/consult to Optometry; Future  -     ketorolac 0.5% (ACULAR) 0.5 % Drop; Place 1 drop into the right eye 4 (four) times daily. for 10 days      Will treat acute symptoms with ketorolac ophthalmic.  Signs she describes are consistent with a pterygium. Will get optometry evaluation. Explained that if in fact a pterygium, these are benign. Risks factors for developing these explained.  Number to schedule eye care appointment given.

## 2023-03-20 ENCOUNTER — TELEPHONE (OUTPATIENT)
Dept: OBSTETRICS AND GYNECOLOGY | Facility: CLINIC | Age: 38
End: 2023-03-20
Payer: OTHER GOVERNMENT

## 2023-03-20 DIAGNOSIS — E28.2 PCO (POLYCYSTIC OVARIES): Primary | ICD-10-CM

## 2023-03-20 DIAGNOSIS — E28.2 PCO (POLYCYSTIC OVARIES): ICD-10-CM

## 2023-03-20 RX ORDER — METFORMIN HYDROCHLORIDE 500 MG/1
1000 TABLET, EXTENDED RELEASE ORAL
Qty: 180 TABLET | Refills: 0 | Status: SHIPPED | OUTPATIENT
Start: 2023-03-20 | End: 2023-05-02

## 2023-05-02 ENCOUNTER — LAB VISIT (OUTPATIENT)
Dept: LAB | Facility: HOSPITAL | Age: 38
End: 2023-05-02
Attending: OBSTETRICS & GYNECOLOGY
Payer: OTHER GOVERNMENT

## 2023-05-02 ENCOUNTER — OFFICE VISIT (OUTPATIENT)
Dept: OBSTETRICS AND GYNECOLOGY | Facility: CLINIC | Age: 38
End: 2023-05-02
Payer: OTHER GOVERNMENT

## 2023-05-02 VITALS
SYSTOLIC BLOOD PRESSURE: 116 MMHG | BODY MASS INDEX: 23.13 KG/M2 | WEIGHT: 143.31 LBS | DIASTOLIC BLOOD PRESSURE: 74 MMHG

## 2023-05-02 DIAGNOSIS — E28.2 PCO (POLYCYSTIC OVARIES): Primary | ICD-10-CM

## 2023-05-02 DIAGNOSIS — N91.0 DELAYED MENSES: ICD-10-CM

## 2023-05-02 DIAGNOSIS — E28.2 PCO (POLYCYSTIC OVARIES): ICD-10-CM

## 2023-05-02 DIAGNOSIS — N97.9 FEMALE INFERTILITY: ICD-10-CM

## 2023-05-02 DIAGNOSIS — Z01.419 GYNECOLOGIC EXAM NORMAL: ICD-10-CM

## 2023-05-02 LAB
ESTRADIOL SERPL-MCNC: 235 PG/ML
FSH SERPL-ACNC: 3.58 MIU/ML
INSULIN COLLECTION INTERVAL: NORMAL
INSULIN SERPL-ACNC: 7.3 UU/ML
TSH SERPL DL<=0.005 MIU/L-ACNC: 1.11 UIU/ML (ref 0.4–4)

## 2023-05-02 PROCEDURE — 83001 ASSAY OF GONADOTROPIN (FSH): CPT | Performed by: OBSTETRICS & GYNECOLOGY

## 2023-05-02 PROCEDURE — 99213 OFFICE O/P EST LOW 20 MIN: CPT | Mod: PBBFAC | Performed by: OBSTETRICS & GYNECOLOGY

## 2023-05-02 PROCEDURE — 99395 PR PREVENTIVE VISIT,EST,18-39: ICD-10-PCS | Mod: S$PBB,,, | Performed by: OBSTETRICS & GYNECOLOGY

## 2023-05-02 PROCEDURE — 83525 ASSAY OF INSULIN: CPT | Performed by: OBSTETRICS & GYNECOLOGY

## 2023-05-02 PROCEDURE — 87624 HPV HI-RISK TYP POOLED RSLT: CPT | Performed by: OBSTETRICS & GYNECOLOGY

## 2023-05-02 PROCEDURE — 83520 IMMUNOASSAY QUANT NOS NONAB: CPT | Performed by: OBSTETRICS & GYNECOLOGY

## 2023-05-02 PROCEDURE — 99999 PR PBB SHADOW E&M-EST. PATIENT-LVL III: CPT | Mod: PBBFAC,,, | Performed by: OBSTETRICS & GYNECOLOGY

## 2023-05-02 PROCEDURE — 82670 ASSAY OF TOTAL ESTRADIOL: CPT | Performed by: OBSTETRICS & GYNECOLOGY

## 2023-05-02 PROCEDURE — 36415 COLL VENOUS BLD VENIPUNCTURE: CPT | Performed by: OBSTETRICS & GYNECOLOGY

## 2023-05-02 PROCEDURE — 99395 PREV VISIT EST AGE 18-39: CPT | Mod: S$PBB,,, | Performed by: OBSTETRICS & GYNECOLOGY

## 2023-05-02 PROCEDURE — 84443 ASSAY THYROID STIM HORMONE: CPT | Performed by: OBSTETRICS & GYNECOLOGY

## 2023-05-02 PROCEDURE — 99999 PR PBB SHADOW E&M-EST. PATIENT-LVL III: ICD-10-PCS | Mod: PBBFAC,,, | Performed by: OBSTETRICS & GYNECOLOGY

## 2023-05-02 PROCEDURE — 88175 CYTOPATH C/V AUTO FLUID REDO: CPT | Performed by: OBSTETRICS & GYNECOLOGY

## 2023-05-02 RX ORDER — LETROZOLE 2.5 MG/1
7.5 TABLET, FILM COATED ORAL DAILY
Qty: 30 TABLET | Refills: 11 | Status: SHIPPED | OUTPATIENT
Start: 2023-05-02 | End: 2023-05-07

## 2023-05-02 RX ORDER — METFORMIN HYDROCHLORIDE 750 MG/1
1500 TABLET, EXTENDED RELEASE ORAL
Qty: 180 TABLET | Refills: 3 | Status: ON HOLD | OUTPATIENT
Start: 2023-05-02 | End: 2023-09-25 | Stop reason: HOSPADM

## 2023-05-02 NOTE — PROGRESS NOTES
Subjective:      Patient ID: Janki Hilton is a 37 y.o. female.    Chief Complaint:  Well Woman      History of Present Illness  HPI  Annual Exam-Premenopausal  Patient presents for annual exam. The patient has no complaints today. The patient is sexually active. GYN screening history: last pap: approximate date 2-3 yrs ago and was normal. The patient wears seatbelts: yes. The patient participates in regular exercise: not asked. Has the patient ever been transfused or tattooed?: no. The patient reports that there is not domestic violence in her life.    Pt with known PCO; pt has struggled with infertility.  Pt has failed 2 cycles of IVF.  Pt currently using letrazole 7.5 mg qd CD3-7 and metformin ER 1000 mg qd.  Menses is currently q 33-42 days with increased regularity in last 3 months.    Pt requesting hormonal labs and nutrition referral.    GYN & OB History  Patient's last menstrual period was 2023.   Date of Last Pap: No result found    OB History    Para Term  AB Living   4 1   1 3     SAB IAB Ectopic Multiple Live Births   3              # Outcome Date GA Lbr Harpal/2nd Weight Sex Delivery Anes PTL Lv   4 SAB            3 SAB            2 SAB            1   22w0d              Review of Systems  Review of Systems   Constitutional: Negative.    Respiratory: Negative.     Cardiovascular: Negative.    Gastrointestinal: Negative.    Endocrine: Negative.    Genitourinary: Negative.    Musculoskeletal: Negative.    Neurological: Negative.    Hematological: Negative.    Psychiatric/Behavioral: Negative.     Breast: negative.         Objective:     Physical Exam:   Constitutional: She is oriented to person, place, and time. She appears well-developed and well-nourished. No distress.    HENT:   Head: Normocephalic and atraumatic.       Pulmonary/Chest: Effort normal. No respiratory distress. Right breast exhibits no inverted nipple, no mass, no nipple discharge, no skin change, no tenderness,  presence, no bleeding, no swelling, no mastectomy, no augmentation and no lumpectomy. Left breast exhibits no inverted nipple, no mass, no nipple discharge, no skin change, no tenderness, presence, no bleeding, no swelling, no mastectomy, no augmentation and no lumpectomy.        Abdominal: Soft. She exhibits no distension and no mass. There is no abdominal tenderness. There is no rebound and no guarding.     Genitourinary:    Vagina, uterus, right adnexa and left adnexa normal.      Pelvic exam was performed with patient supine.   The external female genitalia was normal.   No external genitalia lesions identified,Genitalia hair distrobution normal .   Labial bartholins normal.There is no rash, tenderness, lesion or injury on the right labia. There is no rash, tenderness, lesion or injury on the left labia. Cervix is normal. No no adexnal prolapse, no nodularity or no masses or organomegaly. Right adnexum displays no mass, no tenderness and no fullness. Left adnexum displays no mass, no tenderness and no fullness. No erythema,  no vaginal discharge, tenderness, bleeding, rectocele, cystocele or unspecified prolapse of vaginal walls in the vagina.    No foreign body in the vagina.      No signs of injury in the vagina.   Vagina was moist.Cervix exhibits no motion tenderness, no lesion, no discharge, no friability, no lesion, no tenderness and no polyp.    pap smear completedUterus consistancy normal. and Uerus contour normal  Uterus is not deviated, not enlarged, not fixed, not tender, not hosting fibroids and no uterine prolapse. Normal urethral meatus.Urethral Meatus exhibits: urethral lesion and prolapsedUrethra findings: no urethral mass, no tenderness and no urethral scarringBladder findings: no bladder distention and no bladder tenderness          Musculoskeletal: Normal range of motion and moves all extremeties. No tenderness.       Neurological: She is alert and oriented to person, place, and time. No  cranial nerve deficit. Coordination normal.    Skin: She is not diaphoretic.    Psychiatric: She has a normal mood and affect. Her behavior is normal. Judgment and thought content normal.       Assessment:     1. PCO (polycystic ovaries)    2. Delayed menses    3. Female infertility    4. Gynecologic exam normal              Plan:      Pap and HR HPV collected  Increased metformin to 1500 mg qd  Continue letrazole 7.5 mg but change from CD3-7 to CD5-9  Labs:  TSH, e2, FSH, AMH  Refer to nutrition per pt request

## 2023-05-03 ENCOUNTER — PATIENT MESSAGE (OUTPATIENT)
Dept: OBSTETRICS AND GYNECOLOGY | Facility: CLINIC | Age: 38
End: 2023-05-03
Payer: OTHER GOVERNMENT

## 2023-05-03 LAB — MIS SERPL-MCNC: 9.5 NG/ML (ref 0.15–7.5)

## 2023-05-09 LAB
FINAL PATHOLOGIC DIAGNOSIS: NORMAL
HPV HR 12 DNA SPEC QL NAA+PROBE: NEGATIVE
HPV16 AG SPEC QL: NEGATIVE
HPV18 DNA SPEC QL NAA+PROBE: NEGATIVE
Lab: NORMAL

## 2023-05-24 ENCOUNTER — PATIENT MESSAGE (OUTPATIENT)
Dept: OBSTETRICS AND GYNECOLOGY | Facility: CLINIC | Age: 38
End: 2023-05-24
Payer: OTHER GOVERNMENT

## 2023-05-29 ENCOUNTER — LAB VISIT (OUTPATIENT)
Dept: LAB | Facility: HOSPITAL | Age: 38
End: 2023-05-29
Attending: OBSTETRICS & GYNECOLOGY
Payer: OTHER GOVERNMENT

## 2023-05-29 ENCOUNTER — OFFICE VISIT (OUTPATIENT)
Dept: OBSTETRICS AND GYNECOLOGY | Facility: CLINIC | Age: 38
End: 2023-05-29
Payer: OTHER GOVERNMENT

## 2023-05-29 VITALS
BODY MASS INDEX: 22.81 KG/M2 | WEIGHT: 141.31 LBS | DIASTOLIC BLOOD PRESSURE: 72 MMHG | SYSTOLIC BLOOD PRESSURE: 114 MMHG

## 2023-05-29 DIAGNOSIS — Z32.01 POSITIVE URINE PREGNANCY TEST: ICD-10-CM

## 2023-05-29 DIAGNOSIS — Z32.01 POSITIVE URINE PREGNANCY TEST: Primary | ICD-10-CM

## 2023-05-29 LAB
ABO + RH BLD: NORMAL
BASOPHILS # BLD AUTO: 0.03 K/UL (ref 0–0.2)
BASOPHILS NFR BLD: 0.4 % (ref 0–1.9)
BLD GP AB SCN CELLS X3 SERPL QL: NORMAL
DIFFERENTIAL METHOD: ABNORMAL
EOSINOPHIL # BLD AUTO: 0 K/UL (ref 0–0.5)
EOSINOPHIL NFR BLD: 0.1 % (ref 0–8)
ERYTHROCYTE [DISTWIDTH] IN BLOOD BY AUTOMATED COUNT: 13.2 % (ref 11.5–14.5)
ESTIMATED AVG GLUCOSE: 105 MG/DL (ref 68–131)
HBA1C MFR BLD: 5.3 % (ref 4–5.6)
HBV SURFACE AG SERPL QL IA: NORMAL
HCT VFR BLD AUTO: 35.5 % (ref 37–48.5)
HCV AB SERPL QL IA: NORMAL
HGB BLD-MCNC: 12.1 G/DL (ref 12–16)
HGB S BLD QL SOLY: NEGATIVE
HIV 1+2 AB+HIV1 P24 AG SERPL QL IA: NORMAL
IMM GRANULOCYTES # BLD AUTO: 0.08 K/UL (ref 0–0.04)
IMM GRANULOCYTES NFR BLD AUTO: 1.1 % (ref 0–0.5)
LYMPHOCYTES # BLD AUTO: 1.9 K/UL (ref 1–4.8)
LYMPHOCYTES NFR BLD: 25.4 % (ref 18–48)
MCH RBC QN AUTO: 29 PG (ref 27–31)
MCHC RBC AUTO-ENTMCNC: 34.1 G/DL (ref 32–36)
MCV RBC AUTO: 85 FL (ref 82–98)
MONOCYTES # BLD AUTO: 0.4 K/UL (ref 0.3–1)
MONOCYTES NFR BLD: 5.4 % (ref 4–15)
NEUTROPHILS # BLD AUTO: 5 K/UL (ref 1.8–7.7)
NEUTROPHILS NFR BLD: 67.6 % (ref 38–73)
NRBC BLD-RTO: 0 /100 WBC
PLATELET # BLD AUTO: 302 K/UL (ref 150–450)
PMV BLD AUTO: 9.9 FL (ref 9.2–12.9)
RBC # BLD AUTO: 4.17 M/UL (ref 4–5.4)
SPECIMEN OUTDATE: NORMAL
WBC # BLD AUTO: 7.45 K/UL (ref 3.9–12.7)

## 2023-05-29 PROCEDURE — 99213 PR OFFICE/OUTPT VISIT, EST, LEVL III, 20-29 MIN: ICD-10-PCS | Mod: S$PBB,,, | Performed by: OBSTETRICS & GYNECOLOGY

## 2023-05-29 PROCEDURE — 87340 HEPATITIS B SURFACE AG IA: CPT | Performed by: OBSTETRICS & GYNECOLOGY

## 2023-05-29 PROCEDURE — 99213 OFFICE O/P EST LOW 20 MIN: CPT | Mod: S$PBB,,, | Performed by: OBSTETRICS & GYNECOLOGY

## 2023-05-29 PROCEDURE — 99999 PR PBB SHADOW E&M-EST. PATIENT-LVL III: ICD-10-PCS | Mod: PBBFAC,,, | Performed by: OBSTETRICS & GYNECOLOGY

## 2023-05-29 PROCEDURE — 99213 OFFICE O/P EST LOW 20 MIN: CPT | Mod: PBBFAC | Performed by: OBSTETRICS & GYNECOLOGY

## 2023-05-29 PROCEDURE — 36415 COLL VENOUS BLD VENIPUNCTURE: CPT | Performed by: OBSTETRICS & GYNECOLOGY

## 2023-05-29 PROCEDURE — 87591 N.GONORRHOEAE DNA AMP PROB: CPT | Performed by: OBSTETRICS & GYNECOLOGY

## 2023-05-29 PROCEDURE — 99999 PR PBB SHADOW E&M-EST. PATIENT-LVL III: CPT | Mod: PBBFAC,,, | Performed by: OBSTETRICS & GYNECOLOGY

## 2023-05-29 PROCEDURE — 87086 URINE CULTURE/COLONY COUNT: CPT | Performed by: OBSTETRICS & GYNECOLOGY

## 2023-05-29 PROCEDURE — 86900 BLOOD TYPING SEROLOGIC ABO: CPT | Performed by: OBSTETRICS & GYNECOLOGY

## 2023-05-29 PROCEDURE — 83036 HEMOGLOBIN GLYCOSYLATED A1C: CPT | Performed by: OBSTETRICS & GYNECOLOGY

## 2023-05-29 PROCEDURE — 85660 RBC SICKLE CELL TEST: CPT | Performed by: OBSTETRICS & GYNECOLOGY

## 2023-05-29 PROCEDURE — 87389 HIV-1 AG W/HIV-1&-2 AB AG IA: CPT | Performed by: OBSTETRICS & GYNECOLOGY

## 2023-05-29 PROCEDURE — 85025 COMPLETE CBC W/AUTO DIFF WBC: CPT | Performed by: OBSTETRICS & GYNECOLOGY

## 2023-05-29 PROCEDURE — 86762 RUBELLA ANTIBODY: CPT | Performed by: OBSTETRICS & GYNECOLOGY

## 2023-05-29 PROCEDURE — 86803 HEPATITIS C AB TEST: CPT | Performed by: OBSTETRICS & GYNECOLOGY

## 2023-05-29 PROCEDURE — 86592 SYPHILIS TEST NON-TREP QUAL: CPT | Performed by: OBSTETRICS & GYNECOLOGY

## 2023-05-29 RX ORDER — PROGESTERONE 200 MG/1
200 CAPSULE ORAL NIGHTLY
Qty: 90 CAPSULE | Refills: 3 | Status: ON HOLD | OUTPATIENT
Start: 2023-05-29 | End: 2023-09-25 | Stop reason: HOSPADM

## 2023-05-29 NOTE — PROGRESS NOTES
Subjective:      Patient ID: Janki Hilton is a 38 y.o. female.    Chief Complaint:  Possible Pregnancy      History of Present Illness  HPI  Missed Menses/ Possible Pregnancy  Patient complains of amenorrhea. She believes she could be pregnant. Pregnancy is desired. Sexual Activity: single partner, contraception: none. Current symptoms also include: breast tenderness, nausea, and positive home pregnancy test. Last period was normal.    Pt has been taking metformin ER 1500 mg for PCO.  Pt had also been using letrozole but did not use this past cycle.      Pt has been attempting pregnancy for many years with failed IVF attempt.     Patient's last menstrual period was 2023.     GYN & OB History  Patient's last menstrual period was 2023.   Date of Last Pap: 2023    OB History    Para Term  AB Living   5 1   1 3     SAB IAB Ectopic Multiple Live Births   3              # Outcome Date GA Lbr Harpal/2nd Weight Sex Delivery Anes PTL Lv   5 Current            4 SAB            3 SAB            2 SAB            1   22w0d              Review of Systems  Review of Systems   Constitutional: Negative.    Respiratory: Negative.     Cardiovascular: Negative.    Gastrointestinal: Negative.    Endocrine: Negative.    Genitourinary: Negative.    Musculoskeletal: Negative.    Neurological: Negative.    Psychiatric/Behavioral: Negative.     Breast: negative.         Objective:     Physical Exam:   Constitutional: She is oriented to person, place, and time. She appears well-developed and well-nourished. No distress.    HENT:   Head: Normocephalic and atraumatic.     Neck: No thyromegaly present.     Pulmonary/Chest: Effort normal. No respiratory distress.        Abdominal: Soft. She exhibits no distension and no mass. There is no abdominal tenderness. There is no rebound and no guarding.             Musculoskeletal: Normal range of motion and moves all extremeties. No tenderness.       Neurological:  She is alert and oriented to person, place, and time. No cranial nerve deficit. Coordination normal.    Skin: She is not diaphoretic.    Psychiatric: She has a normal mood and affect. Her behavior is normal. Judgment and thought content normal.       Assessment:     1. Positive urine pregnancy test              Plan:      PN labs and dating sono ordered  Rx prometrium 200 mg qd for 1st trimester  Pt already taking PNV

## 2023-05-29 NOTE — LETTER
May 29, 2023    Janki Riverview  [address]  [place] [address]         [facility]  [address]  [place] [address]  Phone: [phone] May 29, 2023     Patient: Janki Hilton   YOB: 1985   Date of Visit: 5/29/2023       To Whom It May Concern:    It is my medical opinion that Janki Riverview should avoid the summing pool for the next three weeks.    If you have any questions or concerns, please don't hesitate to call.    Sincerely,        Sher Crowley MD

## 2023-05-30 LAB
RPR SER QL: NORMAL
RUBV IGG SER-ACNC: 27.8 IU/ML
RUBV IGG SER-IMP: REACTIVE

## 2023-05-31 LAB
BACTERIA UR CULT: NORMAL
C TRACH DNA SPEC QL NAA+PROBE: NOT DETECTED
N GONORRHOEA DNA SPEC QL NAA+PROBE: NOT DETECTED

## 2023-06-05 ENCOUNTER — HOSPITAL ENCOUNTER (EMERGENCY)
Facility: HOSPITAL | Age: 38
Discharge: HOME | End: 2023-06-05
Attending: EMERGENCY MEDICINE
Payer: OTHER GOVERNMENT

## 2023-06-05 VITALS
SYSTOLIC BLOOD PRESSURE: 130 MMHG | OXYGEN SATURATION: 100 % | RESPIRATION RATE: 16 BRPM | DIASTOLIC BLOOD PRESSURE: 76 MMHG | TEMPERATURE: 98 F | BODY MASS INDEX: 23.32 KG/M2 | WEIGHT: 140 LBS | HEIGHT: 65 IN | HEART RATE: 73 BPM

## 2023-06-05 DIAGNOSIS — O41.8X10 SUBCHORIONIC HEMORRHAGE OF PLACENTA IN FIRST TRIMESTER, SINGLE OR UNSPECIFIED FETUS: ICD-10-CM

## 2023-06-05 DIAGNOSIS — O26.899 PELVIC PAIN IN PREGNANCY: Primary | ICD-10-CM

## 2023-06-05 DIAGNOSIS — N30.00 ACUTE CYSTITIS WITHOUT HEMATURIA: ICD-10-CM

## 2023-06-05 DIAGNOSIS — O46.8X1 SUBCHORIONIC HEMORRHAGE OF PLACENTA IN FIRST TRIMESTER, SINGLE OR UNSPECIFIED FETUS: ICD-10-CM

## 2023-06-05 DIAGNOSIS — R10.2 PELVIC PAIN IN PREGNANCY: Primary | ICD-10-CM

## 2023-06-05 LAB
ABO + RH BLD: NORMAL
ALBUMIN SERPL BCP-MCNC: 3.7 G/DL (ref 3.5–5.2)
ALP SERPL-CCNC: 69 U/L (ref 55–135)
ALT SERPL W/O P-5'-P-CCNC: 15 U/L (ref 10–44)
ANION GAP SERPL CALC-SCNC: 7 MMOL/L (ref 8–16)
AST SERPL-CCNC: 13 U/L (ref 10–40)
B-HCG UR QL: POSITIVE
BACTERIA #/AREA URNS HPF: ABNORMAL /HPF
BACTERIA GENITAL QL WET PREP: ABNORMAL
BASOPHILS # BLD AUTO: 0.03 K/UL (ref 0–0.2)
BASOPHILS NFR BLD: 0.4 % (ref 0–1.9)
BILIRUB SERPL-MCNC: 0.2 MG/DL (ref 0.1–1)
BILIRUB UR QL STRIP: NEGATIVE
BUN SERPL-MCNC: 6 MG/DL (ref 6–20)
CALCIUM SERPL-MCNC: 9.5 MG/DL (ref 8.7–10.5)
CHLORIDE SERPL-SCNC: 106 MMOL/L (ref 95–110)
CLARITY UR: CLEAR
CLUE CELLS VAG QL WET PREP: ABNORMAL
CO2 SERPL-SCNC: 23 MMOL/L (ref 23–29)
COLOR UR: YELLOW
CREAT SERPL-MCNC: 0.7 MG/DL (ref 0.5–1.4)
CTP QC/QA: YES
DIFFERENTIAL METHOD: ABNORMAL
EOSINOPHIL # BLD AUTO: 0 K/UL (ref 0–0.5)
EOSINOPHIL NFR BLD: 0.1 % (ref 0–8)
ERYTHROCYTE [DISTWIDTH] IN BLOOD BY AUTOMATED COUNT: 13.1 % (ref 11.5–14.5)
EST. GFR  (NO RACE VARIABLE): >60 ML/MIN/1.73 M^2
FILAMENT FUNGI VAG WET PREP-#/AREA: ABNORMAL
GLUCOSE SERPL-MCNC: 98 MG/DL (ref 70–110)
GLUCOSE UR QL STRIP: NEGATIVE
HCG INTACT+B SERPL-ACNC: NORMAL MIU/ML
HCT VFR BLD AUTO: 35.8 % (ref 37–48.5)
HGB BLD-MCNC: 12 G/DL (ref 12–16)
HGB UR QL STRIP: NEGATIVE
IMM GRANULOCYTES # BLD AUTO: 0.07 K/UL (ref 0–0.04)
IMM GRANULOCYTES NFR BLD AUTO: 0.9 % (ref 0–0.5)
KETONES UR QL STRIP: NEGATIVE
LEUKOCYTE ESTERASE UR QL STRIP: ABNORMAL
LIPASE SERPL-CCNC: 19 U/L (ref 4–60)
LYMPHOCYTES # BLD AUTO: 1.4 K/UL (ref 1–4.8)
LYMPHOCYTES NFR BLD: 17 % (ref 18–48)
MCH RBC QN AUTO: 28 PG (ref 27–31)
MCHC RBC AUTO-ENTMCNC: 33.5 G/DL (ref 32–36)
MCV RBC AUTO: 83 FL (ref 82–98)
MICROSCOPIC COMMENT: ABNORMAL
MONOCYTES # BLD AUTO: 0.3 K/UL (ref 0.3–1)
MONOCYTES NFR BLD: 3.9 % (ref 4–15)
NEUTROPHILS # BLD AUTO: 6.3 K/UL (ref 1.8–7.7)
NEUTROPHILS NFR BLD: 77.7 % (ref 38–73)
NITRITE UR QL STRIP: NEGATIVE
NRBC BLD-RTO: 0 /100 WBC
PH UR STRIP: 7 [PH] (ref 5–8)
PLATELET # BLD AUTO: 306 K/UL (ref 150–450)
PMV BLD AUTO: 10.5 FL (ref 9.2–12.9)
POTASSIUM SERPL-SCNC: 3.7 MMOL/L (ref 3.5–5.1)
PROT SERPL-MCNC: 7.1 G/DL (ref 6–8.4)
PROT UR QL STRIP: NEGATIVE
RBC # BLD AUTO: 4.29 M/UL (ref 4–5.4)
SODIUM SERPL-SCNC: 136 MMOL/L (ref 136–145)
SP GR UR STRIP: 1.01 (ref 1–1.03)
SPECIMEN SOURCE: ABNORMAL
SQUAMOUS #/AREA URNS HPF: 1 /HPF
T VAGINALIS GENITAL QL WET PREP: ABNORMAL
URN SPEC COLLECT METH UR: ABNORMAL
UROBILINOGEN UR STRIP-ACNC: NEGATIVE EU/DL
WBC # BLD AUTO: 8.13 K/UL (ref 3.9–12.7)
WBC #/AREA URNS HPF: 7 /HPF (ref 0–5)
WBC #/AREA VAG WET PREP: ABNORMAL
YEAST GENITAL QL WET PREP: ABNORMAL

## 2023-06-05 PROCEDURE — 87591 N.GONORRHOEAE DNA AMP PROB: CPT | Performed by: PHYSICIAN ASSISTANT

## 2023-06-05 PROCEDURE — 85025 COMPLETE CBC W/AUTO DIFF WBC: CPT | Performed by: EMERGENCY MEDICINE

## 2023-06-05 PROCEDURE — 83690 ASSAY OF LIPASE: CPT | Performed by: EMERGENCY MEDICINE

## 2023-06-05 PROCEDURE — 25000003 PHARM REV CODE 250: Performed by: EMERGENCY MEDICINE

## 2023-06-05 PROCEDURE — 96360 HYDRATION IV INFUSION INIT: CPT

## 2023-06-05 PROCEDURE — 81025 URINE PREGNANCY TEST: CPT | Performed by: EMERGENCY MEDICINE

## 2023-06-05 PROCEDURE — 84702 CHORIONIC GONADOTROPIN TEST: CPT | Performed by: EMERGENCY MEDICINE

## 2023-06-05 PROCEDURE — 99284 EMERGENCY DEPT VISIT MOD MDM: CPT | Mod: 25

## 2023-06-05 PROCEDURE — 96361 HYDRATE IV INFUSION ADD-ON: CPT

## 2023-06-05 PROCEDURE — 81000 URINALYSIS NONAUTO W/SCOPE: CPT | Performed by: EMERGENCY MEDICINE

## 2023-06-05 PROCEDURE — 86900 BLOOD TYPING SEROLOGIC ABO: CPT | Performed by: EMERGENCY MEDICINE

## 2023-06-05 PROCEDURE — 80053 COMPREHEN METABOLIC PANEL: CPT | Performed by: EMERGENCY MEDICINE

## 2023-06-05 PROCEDURE — 87210 SMEAR WET MOUNT SALINE/INK: CPT | Performed by: PHYSICIAN ASSISTANT

## 2023-06-05 RX ORDER — NITROFURANTOIN 25; 75 MG/1; MG/1
100 CAPSULE ORAL 2 TIMES DAILY
Qty: 10 CAPSULE | Refills: 0 | Status: SHIPPED | OUTPATIENT
Start: 2023-06-05 | End: 2023-06-10

## 2023-06-05 RX ADMIN — SODIUM CHLORIDE 1000 ML: 9 INJECTION, SOLUTION INTRAVENOUS at 07:06

## 2023-06-05 NOTE — ED PROVIDER NOTES
Encounter Date: 6/5/2023    SCRIBE #1 NOTE: I, Escobar Lazar, am scribing for, and in the presence of,  Maricel Justin PA-C. Other sections scribed: HPI, ROS, PE.     History     Chief Complaint   Patient presents with    Abdominal Pain     Pt reports intermittent cramping abdominal pain and back pain since last night. Patient is 6 weeks pregnant. Denies bleeding. Pt reports having 5 miscarriages and this feels like she is having another.Pt reports taking metformin for PCOS and progesterone.    Threatened Miscarriage     CC: Abdominal pain    HPI: History is provided by independent historian. This is a 38 y.o. F who presents to the ED for emergent evaluation of acute abdominal pain with associated back pain that began last week. Pt describes the abdominal pain as a sharp and cramping pain. Pt states that she is 6 weeks pregnant. She is concerned that she is possibly having a miscarriage due to experiencing similar symptoms with previous miscarriages. She denies vaginal bleeding with previous miscarriages and she currently denies vaginal bleeding. 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 She does not have any living children. The pt states that she has PCOS, which she takes Metformin for PCOS and is compliant. She reports starting Progesterone 1 week ago. Her Ob/Gyn is Dr. Crowley. She has not had an US for this pregnancy. She states that she has been eating and drinking okay. Pt endorses subjective fever, chills, and generalized body aches last night that have since resolved. She also endorses some vomiting, but not currently. Pt denies dysuria, diarrhea, or appetite change.      No distress well appearing nml ears nml head  Nml heart and mateusz cosunds benign belly no CVA tenderness     The history is provided by the patient. No  was used.   Review of patient's allergies indicates:  No Known Allergies  History reviewed. No pertinent past medical history.  History reviewed. No pertinent  surgical history.  History reviewed. No pertinent family history.  Social History     Tobacco Use    Smoking status: Never    Smokeless tobacco: Never   Substance Use Topics    Alcohol use: Yes     Comment: occasional     Drug use: Never     Review of Systems   Constitutional:  Positive for chills (resolved). Negative for appetite change. Fever: resolved.  HENT:  Negative for ear discharge, ear pain, postnasal drip, rhinorrhea and sore throat.    Respiratory:  Negative for shortness of breath.    Cardiovascular:  Negative for chest pain.   Gastrointestinal:  Positive for abdominal pain. Negative for diarrhea, nausea and vomiting.   Genitourinary:  Negative for dysuria, flank pain and vaginal bleeding.   Musculoskeletal:  Positive for back pain and myalgias (resolved).   Skin:  Negative for rash and wound.   Neurological:  Negative for dizziness, weakness, light-headedness and headaches.   Hematological:  Does not bruise/bleed easily.     Physical Exam     Initial Vitals [06/05/23 0714]   BP Pulse Resp Temp SpO2   115/61 80 16 98.5 °F (36.9 °C) 100 %      MAP       --         Physical Exam    Nursing note and vitals reviewed.  Constitutional: She appears well-developed and well-nourished. She is not diaphoretic.  Non-toxic appearance. No distress.   HENT:   Head: Normocephalic and atraumatic.   Right Ear: Hearing and external ear normal.   Left Ear: Hearing and external ear normal.   Nose: Nose normal.   Mouth/Throat: Uvula is midline, oropharynx is clear and moist and mucous membranes are normal. No oropharyngeal exudate.   Eyes: Conjunctivae and EOM are normal. Pupils are equal, round, and reactive to light.   Neck:   Normal range of motion.  Cardiovascular:  Normal rate, regular rhythm and normal heart sounds.           Pulmonary/Chest: Breath sounds normal. No respiratory distress. She has no wheezes. She has no rhonchi. She has no rales.   Abdominal: Abdomen is soft. Bowel sounds are normal. There is no  abdominal tenderness.   No right CVA tenderness.  No left CVA tenderness. There is no rebound and no guarding.   Genitourinary:    Genitourinary Comments: Scant white discharge in vaginal vault.  No cervical motion tenderness.  No adnexal tenderness.     Musculoskeletal:         General: No edema. Normal range of motion.      Cervical back: Normal range of motion.     Neurological: She is alert and oriented to person, place, and time.   Skin: Skin is warm and dry.   Psychiatric: She has a normal mood and affect.       ED Course   Procedures  Labs Reviewed   VAGINAL SCREEN - Abnormal; Notable for the following components:       Result Value    WBC - Vaginal Screen Rare (*)     Bacteria - Vaginal Screen Rare (*)     All other components within normal limits    Narrative:     Release to patient->Immediate   URINALYSIS, REFLEX TO URINE CULTURE - Abnormal; Notable for the following components:    Leukocytes, UA 1+ (*)     All other components within normal limits    Narrative:     Specimen Source->Urine   CBC W/ AUTO DIFFERENTIAL - Abnormal; Notable for the following components:    Hematocrit 35.8 (*)     Immature Granulocytes 0.9 (*)     Immature Grans (Abs) 0.07 (*)     Gran % 77.7 (*)     Lymph % 17.0 (*)     Mono % 3.9 (*)     All other components within normal limits    Narrative:     Release to patient->Immediate   COMPREHENSIVE METABOLIC PANEL - Abnormal; Notable for the following components:    Anion Gap 7 (*)     All other components within normal limits    Narrative:     Release to patient->Immediate   URINALYSIS MICROSCOPIC - Abnormal; Notable for the following components:    WBC, UA 7 (*)     All other components within normal limits    Narrative:     Specimen Source->Urine   POCT URINE PREGNANCY - Abnormal; Notable for the following components:    POC Preg Test, Ur Positive (*)     All other components within normal limits   C. TRACHOMATIS/N. GONORRHOEAE BY AMP DNA   HCG, QUANTITATIVE    Narrative:      Release to patient->Immediate   LIPASE    Narrative:     Release to patient->Immediate   GROUP & RH          Imaging Results              US OB <14 Wks TransAbd & TransVag, Single Gestation (XPD) (Final result)  Result time 23 08:48:40      Final result by Arcenio Tomlin MD (23 08:48:40)                   Impression:      Single live intrauterine pregnancy with estimated gestational age 6 weeks 6 days. and an LUH of 2024 (AUA).    Question subchorionic/perigestational hematoma measuring up to 1.3 cm.    Additional details, as provided in the body of the report.      Electronically signed by: Arcenio Tomlin  Date:    2023  Time:    08:48               Narrative:    EXAMINATION:  US OB <14 WEEKS, TRANSABDOM & TRANSVAG, SINGLE GESTATION (XPD)    CLINICAL HISTORY:  Vag Bleeding;    TECHNIQUE:  Transabdominal sonography of the pelvis was performed, followed by transvaginal sonography to better evaluate the uterus and ovaries.    COMPARISON:  None.    FINDINGS:  Uterus: Measures 8.8 x 5.1 x 6.5 cm.    Intrauterine gestation(s): Single    Mean gestational sac diameter: 2.48 cm    Yolk sac: Identified.    Crown-rump length (CRL): 0.4 cm    Cardiac activity: 127 bpm    Subchorionic hemorrhage: Question hypoechoic subchorionic/perigestational hematoma measuring on the order of 1.3 x 0.9 x 0.9 cm.    Right ovary: Measures 4 x 2.4 x 4.7 cm.  Question corpus luteum cyst measuring 2.9 x 1.6 x 2.1 cm.    Left ovary: Measures 3.9 x 2.5 x 2.9 cm.    Miscellaneous: No Free Fluid.                                       Medications   sodium chloride 0.9% bolus 1,000 mL 1,000 mL (1,000 mLs Intravenous New Bag 23 0731)     Medical Decision Making:   Initial Assessment:   Urgent evaluation of a 38-year-old female A5 approximately 6 weeks pregnant who presents to the emergency department with pelvic cramping.  Patient is afebrile and nontoxic appearing.  Benign belly.  UPT is positive.  She is not had a  confirmed IUP.  Full workup is performed showing no significant abnormality.  Patient is Rh positive.  Beta hCG is greater than 100,000.  Ultrasound confirms live IUP approximately 6 weeks and 6 days.  Small subchorionic hemorrhage noted.  Urine does show some white blood cells and leukocytes.  Will treat with Macrobid.  Patient is advised to continue following up with OBGYN at next scheduled appointment.  Given strict return precautions.        Scribe Attestation:   Scribe #1: I performed the above scribed service and the documentation accurately describes the services I performed. I attest to the accuracy of the note.                 I, Maricel mejia, personally performed the services described in this documentation. All medical record entries made by the scribe were at my direction and in my presence. I have reviewed the chart and agree that the record reflects my personal performance and is accurate and complete.    Clinical Impression:   Final diagnoses:  [O26.899, R10.2] Pelvic pain in pregnancy (Primary)  [N30.00] Acute cystitis without hematuria        ED Disposition Condition    Discharge Stable          ED Prescriptions    None       Follow-up Information    None          Maricel Justin PA-C  06/05/23 0856

## 2023-06-05 NOTE — DISCHARGE INSTRUCTIONS
[place] se mencionó, caceres ultrasonido parece normal hoy. El bebé mide 6 semanas y 6 días con un latido normal. Tiene donnell hemorragia subcoriónica, que es donnell pequeña área de sangrado que generalmente no causa ningún problema. Caceres OBGYN seguirá esto de cerca. También parece que tiene donnell infección en la vejiga. He enviado antibióticos a caceres farmacia. [place] suerte con shaggy embarazo. Regrese al departamento de emergencias si cualquier síntoma o inquietud empeora.

## 2023-06-05 NOTE — Clinical Note
"Janki Andrews Branch" Branch was seen and treated in our emergency department on 6/5/2023.  She may return to work on 06/07/2023.       If you have any questions or concerns, please don't hesitate to call.      Maricel Justin PA-C"

## 2023-06-06 LAB
C TRACH DNA SPEC QL NAA+PROBE: NOT DETECTED
N GONORRHOEA DNA SPEC QL NAA+PROBE: NOT DETECTED

## 2023-06-11 ENCOUNTER — PATIENT MESSAGE (OUTPATIENT)
Dept: OBSTETRICS AND GYNECOLOGY | Facility: CLINIC | Age: 38
End: 2023-06-11
Payer: OTHER GOVERNMENT

## 2023-06-22 ENCOUNTER — PROCEDURE VISIT (OUTPATIENT)
Dept: MATERNAL FETAL MEDICINE | Facility: CLINIC | Age: 38
End: 2023-06-22
Payer: OTHER GOVERNMENT

## 2023-06-22 DIAGNOSIS — Z36.89 ENCOUNTER FOR FETAL ANATOMIC SURVEY: Primary | ICD-10-CM

## 2023-06-22 DIAGNOSIS — Z32.01 POSITIVE URINE PREGNANCY TEST: ICD-10-CM

## 2023-06-22 PROCEDURE — 76801 US OB/GYN EXTENDED PROCEDURE (VIEWPOINT): ICD-10-PCS | Mod: 26,S$PBB,, | Performed by: OBSTETRICS & GYNECOLOGY

## 2023-06-22 PROCEDURE — 76801 OB US < 14 WKS SINGLE FETUS: CPT | Mod: PBBFAC,PO | Performed by: OBSTETRICS & GYNECOLOGY

## 2023-07-06 ENCOUNTER — HOSPITAL ENCOUNTER (EMERGENCY)
Facility: HOSPITAL | Age: 38
Discharge: HOME | End: 2023-07-06
Attending: EMERGENCY MEDICINE
Payer: OTHER GOVERNMENT

## 2023-07-06 VITALS
HEART RATE: 78 BPM | WEIGHT: 142 LBS | HEIGHT: 65 IN | DIASTOLIC BLOOD PRESSURE: 67 MMHG | TEMPERATURE: 98 F | RESPIRATION RATE: 16 BRPM | SYSTOLIC BLOOD PRESSURE: 119 MMHG | BODY MASS INDEX: 23.66 KG/M2 | OXYGEN SATURATION: 100 %

## 2023-07-06 DIAGNOSIS — M54.40 ACUTE BILATERAL LOW BACK PAIN WITH SCIATICA, SCIATICA LATERALITY UNSPECIFIED: Primary | ICD-10-CM

## 2023-07-06 LAB
ALBUMIN SERPL BCP-MCNC: 3.6 G/DL (ref 3.5–5.2)
ALP SERPL-CCNC: 75 U/L (ref 55–135)
ALT SERPL W/O P-5'-P-CCNC: 15 U/L (ref 10–44)
ANION GAP SERPL CALC-SCNC: 7 MMOL/L (ref 8–16)
AST SERPL-CCNC: 16 U/L (ref 10–40)
B-HCG UR QL: POSITIVE
BASOPHILS # BLD AUTO: 0.05 K/UL (ref 0–0.2)
BASOPHILS NFR BLD: 0.5 % (ref 0–1.9)
BILIRUB SERPL-MCNC: 0.4 MG/DL (ref 0.1–1)
BILIRUB UR QL STRIP: NEGATIVE
BUN SERPL-MCNC: 5 MG/DL (ref 6–20)
CALCIUM SERPL-MCNC: 9.9 MG/DL (ref 8.7–10.5)
CHLORIDE SERPL-SCNC: 103 MMOL/L (ref 95–110)
CLARITY UR: CLEAR
CO2 SERPL-SCNC: 23 MMOL/L (ref 23–29)
COLOR UR: YELLOW
CREAT SERPL-MCNC: 0.6 MG/DL (ref 0.5–1.4)
CTP QC/QA: YES
DIFFERENTIAL METHOD: ABNORMAL
EOSINOPHIL # BLD AUTO: 0 K/UL (ref 0–0.5)
EOSINOPHIL NFR BLD: 0.3 % (ref 0–8)
ERYTHROCYTE [DISTWIDTH] IN BLOOD BY AUTOMATED COUNT: 13.4 % (ref 11.5–14.5)
EST. GFR  (NO RACE VARIABLE): >60 ML/MIN/1.73 M^2
GLUCOSE SERPL-MCNC: 99 MG/DL (ref 70–110)
GLUCOSE UR QL STRIP: NEGATIVE
HCG INTACT+B SERPL-ACNC: NORMAL MIU/ML
HCT VFR BLD AUTO: 35.9 % (ref 37–48.5)
HGB BLD-MCNC: 12.4 G/DL (ref 12–16)
HGB UR QL STRIP: NEGATIVE
IMM GRANULOCYTES # BLD AUTO: 0.14 K/UL (ref 0–0.04)
IMM GRANULOCYTES NFR BLD AUTO: 1.3 % (ref 0–0.5)
KETONES UR QL STRIP: NEGATIVE
LEUKOCYTE ESTERASE UR QL STRIP: NEGATIVE
LYMPHOCYTES # BLD AUTO: 1.8 K/UL (ref 1–4.8)
LYMPHOCYTES NFR BLD: 15.8 % (ref 18–48)
MCH RBC QN AUTO: 29.3 PG (ref 27–31)
MCHC RBC AUTO-ENTMCNC: 34.5 G/DL (ref 32–36)
MCV RBC AUTO: 85 FL (ref 82–98)
MONOCYTES # BLD AUTO: 0.6 K/UL (ref 0.3–1)
MONOCYTES NFR BLD: 5.3 % (ref 4–15)
NEUTROPHILS # BLD AUTO: 8.6 K/UL (ref 1.8–7.7)
NEUTROPHILS NFR BLD: 76.8 % (ref 38–73)
NITRITE UR QL STRIP: NEGATIVE
NRBC BLD-RTO: 0 /100 WBC
PH UR STRIP: 7 [PH] (ref 5–8)
PLATELET # BLD AUTO: 292 K/UL (ref 150–450)
PMV BLD AUTO: 9.8 FL (ref 9.2–12.9)
POTASSIUM SERPL-SCNC: 3.6 MMOL/L (ref 3.5–5.1)
PROT SERPL-MCNC: 7.4 G/DL (ref 6–8.4)
PROT UR QL STRIP: NEGATIVE
RBC # BLD AUTO: 4.23 M/UL (ref 4–5.4)
SODIUM SERPL-SCNC: 133 MMOL/L (ref 136–145)
SP GR UR STRIP: 1.01 (ref 1–1.03)
URN SPEC COLLECT METH UR: NORMAL
UROBILINOGEN UR STRIP-ACNC: NEGATIVE EU/DL
WBC # BLD AUTO: 11.11 K/UL (ref 3.9–12.7)

## 2023-07-06 PROCEDURE — 25000003 PHARM REV CODE 250: Performed by: PHYSICIAN ASSISTANT

## 2023-07-06 PROCEDURE — 81003 URINALYSIS AUTO W/O SCOPE: CPT | Performed by: PHYSICIAN ASSISTANT

## 2023-07-06 PROCEDURE — 81025 URINE PREGNANCY TEST: CPT | Performed by: PHYSICIAN ASSISTANT

## 2023-07-06 PROCEDURE — 84702 CHORIONIC GONADOTROPIN TEST: CPT | Performed by: PHYSICIAN ASSISTANT

## 2023-07-06 PROCEDURE — 80053 COMPREHEN METABOLIC PANEL: CPT | Performed by: PHYSICIAN ASSISTANT

## 2023-07-06 PROCEDURE — 99284 EMERGENCY DEPT VISIT MOD MDM: CPT | Mod: 25

## 2023-07-06 PROCEDURE — 85025 COMPLETE CBC W/AUTO DIFF WBC: CPT | Performed by: PHYSICIAN ASSISTANT

## 2023-07-06 RX ORDER — ACETAMINOPHEN 325 MG/1
650 TABLET ORAL
Status: COMPLETED | OUTPATIENT
Start: 2023-07-06 | End: 2023-07-06

## 2023-07-06 RX ORDER — ACETAMINOPHEN 500 MG
500 TABLET ORAL EVERY 6 HOURS PRN
Qty: 20 TABLET | Refills: 0 | Status: ON HOLD | OUTPATIENT
Start: 2023-07-06 | End: 2023-09-25 | Stop reason: HOSPADM

## 2023-07-06 RX ADMIN — ACETAMINOPHEN 650 MG: 325 TABLET ORAL at 07:07

## 2023-07-06 NOTE — ED NOTES
Pt ambulatory to ED for bilateral lower abdominal pain, radiating to bilateral hips and lower back.  Pt 11 weeks pregnant. Pt denies vaginal bleeding/discharge, denies hematuria. Pt denies dizziness, denies HA.  VSS.

## 2023-07-06 NOTE — ED PROVIDER NOTES
"Encounter Date: 7/6/2023       History     Chief Complaint   Patient presents with    Back Pain     Pt reports to the ED with C/O lower back pain that radiates to the lower ABD x 5 days. Pt reports "I am 11 weeks pregnant and my LMP was in April of this year." Pt is a G5-6, A 5-6, L0. Pt denies any vaginal bleeding or discharge. Pt placed in QT for eval.      38-year-old female who is currently 11 weeks pregnant presents the ED today for evaluation of lower back pain and lower abdominal/pelvic pain onset a few days ago.  Patient reports the pain will radiate down her legs occasionally.  Reports she has never felt this pain outside from being pregnant.  Reports associated symptoms of chills.  She denies nausea, vomiting, fever, vaginal bleeding, vaginal discharge, hematuria, dysuria.  Reports she has had multiple miscarriages in the past and she has a history of PCOS.  She is currently taking progesterone and metformin.  She states her prior miscarriages have presented with the same symptoms that she was experiencing today.  Denies vaginal bleeding with prior miscarriages.  Reports she tried to wait for her OBGYN appointment, however states the appointment was rescheduled 3 times and therefore she came to the ED. Patient has had an ultrasound to confirm intrauterine pregnancy about 1 month ago.  Patient denies having any new sexual partners since she was last seen in the ED.    The history is provided by the patient. No  was used.   Review of patient's allergies indicates:  No Known Allergies  No past medical history on file.  No past surgical history on file.  History reviewed. No pertinent family history.  Social History     Tobacco Use    Smoking status: Never    Smokeless tobacco: Never   Substance Use Topics    Alcohol use: Yes     Comment: occasional     Drug use: Never     Review of Systems   Constitutional:  Negative for chills, fatigue and fever.   HENT:  Negative for congestion, sinus " pressure, sneezing and sore throat.    Eyes:  Negative for visual disturbance.   Respiratory:  Negative for cough and shortness of breath.    Cardiovascular:  Negative for chest pain.   Gastrointestinal:  Negative for nausea and vomiting.   Genitourinary:  Positive for pelvic pain. Negative for difficulty urinating, dysuria and hematuria.   Musculoskeletal:  Positive for back pain.   Skin:  Negative for rash.   Neurological:  Negative for dizziness, syncope and weakness.   Hematological:  Does not bruise/bleed easily.     Physical Exam     Initial Vitals [07/06/23 1600]   BP Pulse Resp Temp SpO2   119/67 78 16 98.4 °F (36.9 °C) 100 %      MAP       --         Physical Exam    Nursing note and vitals reviewed.  Constitutional: She appears well-developed and well-nourished. She is not diaphoretic. No distress.   HENT:   Head: Normocephalic and atraumatic.   Right Ear: External ear normal.   Left Ear: External ear normal.   Nose: Nose normal.   Eyes: Conjunctivae are normal.   Cardiovascular:  Normal rate.           Pulmonary/Chest: No respiratory distress.   Abdominal: She exhibits no distension.   Musculoskeletal:         General: No edema.     Neurological: She is alert and oriented to person, place, and time. GCS score is 15. GCS eye subscore is 4. GCS verbal subscore is 5. GCS motor subscore is 6.   Skin: Skin is warm and dry.   Psychiatric: She has a normal mood and affect. Her behavior is normal.       ED Course   Procedures  Labs Reviewed   CBC W/ AUTO DIFFERENTIAL - Abnormal; Notable for the following components:       Result Value    Hematocrit 35.9 (*)     Immature Granulocytes 1.3 (*)     Gran # (ANC) 8.6 (*)     Immature Grans (Abs) 0.14 (*)     Gran % 76.8 (*)     Lymph % 15.8 (*)     All other components within normal limits    Narrative:     Release to patient->Immediate   COMPREHENSIVE METABOLIC PANEL - Abnormal; Notable for the following components:    Sodium 133 (*)     BUN 5 (*)     Anion Gap 7 (*)      All other components within normal limits    Narrative:     Release to patient->Immediate   POCT URINE PREGNANCY - Abnormal; Notable for the following components:    POC Preg Test, Ur Positive (*)     All other components within normal limits   URINALYSIS, REFLEX TO URINE CULTURE    Narrative:     Specimen Source->Urine   HCG, QUANTITATIVE    Narrative:     Release to patient->Immediate          Imaging Results              US OB <14 Wks, TransAbd, Single Gestation (Final result)  Result time 07/06/23 18:29:22   Procedure changed from US OB <14 Wks TransAbd & TransVag, Single Gestation (XPD)     Final result by Lui Broderick MD (07/06/23 18:29:22)                   Impression:      Single live intrauterine gestation corresponding to 11 weeks and 5 days.  Expected date of delivery of 01/20/2024. No complication identified.    Normal appearance of the ovaries.  No sonographic evidence of vascular compromise on the current examination.      Electronically signed by: Lui Broderick MD  Date:    07/06/2023  Time:    18:29               Narrative:    EXAMINATION:  US OB <14 WEEKS TRANSABDOM, SINGLE GESTATION    CLINICAL HISTORY:  pelvic pain;    TECHNIQUE:  Limited transabdominal pelvic ultrasound was performed.    COMPARISON:  06/05/2023.    FINDINGS:  The uterus measures 12.7 x 7.1 x 9.8 cm.  There is a single live intrauterine gestation with average ultrasound age of 11 weeks and 5 days.  The expected date of delivery is 01/20/2024.  The fetal heart rate is 158 beats per minute.  No perigestational collections identified.    The right ovary measures 3.6 x 3.0 x 2.2 cm.  There is normal flow to the right ovary.    The left ovary measures 3.7 x 2.6 x 3.2 cm.  There is normal flow to the left ovary.    There is no evidence of free fluid.                                       Medications   acetaminophen tablet 650 mg (650 mg Oral Given 7/6/23 1906)     Medical Decision Making:   Differential Diagnosis:   Miscarriage,  "ovarian torsion, sciatica, UTI  Clinical Tests:   Lab Tests: Ordered and Reviewed       <> Summary of Lab: Please see ED course below for lab interpretation  Radiological Study: Ordered and Reviewed  ED Management:  38-year-old female who is currently 11 weeks pregnant presents the ED today for evaluation of lower back pain and lower abdominal/pelvic pain onset a few days ago.  Patient reports the pain will radiate down her legs occasionally.  Reports she has never felt this pain outside from being pregnant.  Reports associated symptoms of chills.  She denies nausea, vomiting, fever, vaginal bleeding, vaginal discharge, hematuria, dysuria.  Reports she has had multiple miscarriages in the past and she has a history of PCOS.  She is currently taking progesterone and metformin.  She states her prior miscarriages have presented with the same symptoms that she was experiencing today.  Denies vaginal bleeding with prior miscarriages.  Reports she tried to wait for her OBGYN appointment, however states the appointment was rescheduled 3 times and therefore she came to the ED. Patient has had an ultrasound to confirm intrauterine pregnancy about 1 month ago.  Patient denies having any new sexual partners since she was last seen in the ED. She denies any recent fall, injury or trauma. Reports she mainly came to the ED to see if her pain is causing harm to her baby or if she is experiencing a miscarriage. ED workup revealed positive UPT, essentially normal CBC and CMP, bHCG that is consistent with 11 weeks pregnancy, and US that shows "Single live intrauterine gestation corresponding to 11 weeks and 5 days.  Expected date of delivery of 01/20/2024. No complication identified. Normal appearance of the ovaries.  No sonographic evidence of vascular compromise on the current examination.". We discussed that her pain may be related to sciatica as it is located in the lower back and radiates down her legs. I will discharge pt with " patient education regarding exercises she he can do at home for sciatic pain.  She was treated with Tylenol in the ED and will be discharged home with a prescription to take as needed for pain.  Advised patient to follow up with her OBGYN for further recommendations.           ED Course as of 07/07/23 1005   Thu Jul 06, 2023 1716 Urinalysis, Reflex to Urine Culture Urine, Clean Catch  No signs of infection [MB]   1717 Comprehensive metabolic panel(!)  Mild hyponatremia, otherwise wnl [MB]   1717 CBC auto differential(!)  wnl [MB]   1745 POCT urine pregnancy(!)  positive [MB]   1745 hCG, quantitative, pregnancy  Consistent with 11 weeks pregnancy [MB]      ED Course User Index  [MB] Sheryl Bernabe PA-C                 Clinical Impression:   Final diagnoses:  [M54.40] Acute bilateral low back pain with sciatica, sciatica laterality unspecified (Primary)        ED Disposition Condition    Discharge Stable          ED Prescriptions       Medication Sig Dispense Start Date End Date Auth. Provider    acetaminophen (TYLENOL) 500 MG tablet Take 1 tablet (500 mg total) by mouth every 6 (six) hours as needed for Pain. 20 tablet 7/6/2023 -- Sheryl Bernabe PA-C          Follow-up Information       Follow up With Specialties Details Why Contact Info    [facility] - Emergency Dept Emergency Medicine Go to  As needed, If symptoms worsen [address] [place] [address] [phone]             Sheryl Bernabe PA-C  07/07/23 100

## 2023-07-07 NOTE — DISCHARGE INSTRUCTIONS
Puede agustin el Tylenol recetado según sea necesario para el dolor. También puede alternar el calor y el hielo en la parte baja de la espalda según sea necesario para un control adicional del dolor. Heather el material educativo para pacientes sobre ejercicios para la ciática. Dirk un seguimiento con caceres obstetra para donnell reevaluación según lo programado la próxima semana.

## 2023-07-11 ENCOUNTER — INITIAL PRENATAL (OUTPATIENT)
Dept: OBSTETRICS AND GYNECOLOGY | Facility: CLINIC | Age: 38
End: 2023-07-11
Payer: OTHER GOVERNMENT

## 2023-07-11 VITALS
BODY MASS INDEX: 24.49 KG/M2 | SYSTOLIC BLOOD PRESSURE: 108 MMHG | DIASTOLIC BLOOD PRESSURE: 72 MMHG | WEIGHT: 147.19 LBS

## 2023-07-11 DIAGNOSIS — Z34.91 INITIAL OBSTETRIC VISIT IN FIRST TRIMESTER: ICD-10-CM

## 2023-07-11 DIAGNOSIS — O09.521 MULTIGRAVIDA OF ADVANCED MATERNAL AGE IN FIRST TRIMESTER: Primary | ICD-10-CM

## 2023-07-11 DIAGNOSIS — N96 HISTORY OF MULTIPLE MISCARRIAGES: ICD-10-CM

## 2023-07-11 PROCEDURE — 99213 OFFICE O/P EST LOW 20 MIN: CPT | Mod: PBBFAC

## 2023-07-11 PROCEDURE — 0502F SUBSEQUENT PRENATAL CARE: CPT | Mod: ,,,

## 2023-07-11 PROCEDURE — 99999 PR PBB SHADOW E&M-EST. PATIENT-LVL III: CPT | Mod: PBBFAC,,,

## 2023-07-11 PROCEDURE — 99999 PR PBB SHADOW E&M-EST. PATIENT-LVL III: ICD-10-PCS | Mod: PBBFAC,,,

## 2023-07-11 PROCEDURE — 87086 URINE CULTURE/COLONY COUNT: CPT

## 2023-07-11 PROCEDURE — 0502F PR SUBSEQUENT PRENATAL CARE: ICD-10-PCS | Mod: ,,,

## 2023-07-11 RX ORDER — PROGESTERONE 200 MG/1
200 CAPSULE ORAL
Status: ON HOLD | COMMUNITY
Start: 2023-05-29 | End: 2023-09-25 | Stop reason: HOSPADM

## 2023-07-11 RX ORDER — ASPIRIN 81 MG/1
81 TABLET ORAL DAILY
Qty: 30 TABLET | Refills: 11 | Status: SHIPPED | OUTPATIENT
Start: 2023-07-11 | End: 2024-07-10

## 2023-07-11 RX ORDER — METFORMIN HYDROCHLORIDE 750 MG/1
TABLET, EXTENDED RELEASE ORAL
Status: ON HOLD | COMMUNITY
Start: 2023-05-02 | End: 2023-09-25 | Stop reason: HOSPADM

## 2023-07-11 NOTE — PROGRESS NOTES
38 y.o.  at 11w6d here for initial OB visit.     Pregnancy complicated by:  AMA, Hx of multiple miscarriages, and previous  delivery at 22 weeks     Pt has no major complaints today. She was seen in the ED for pelvic and Hoang leg pains- reports better now  Denies vaginal bleeding, leakage of fluid, or contractions.     Initial OB lab profile completed    Dating US completed     Discussed Connective MOM program.    1. Initial obstetric visit in first trimester  - PNV,calcium 72-iron-folic acid (PRENATAL VITAMIN PLUS LOW IRON) 27 mg iron- 1 mg Tab; Take one tablet daily.  Prescribe prenatal covered by insurance  Dispense: 90 tablet; Refill: 11  - Assign Connected MOM Program Consent Questionnaire    2. Multigravida of advanced maternal age in first trimester  3. History of multiple miscarriages  - aspirin (ECOTRIN) 81 MG EC tablet; Take 1 tablet (81 mg total) by mouth once daily.  Dispense: 30 tablet; Refill: 11          Principles of prenatal care and precautions reviewed.  Return 4 wks or sooner prn.  All questions answered, voiced understanding.  Significant other present for visit.

## 2023-07-13 LAB — BACTERIA UR CULT: NORMAL

## 2023-07-17 ENCOUNTER — TELEPHONE (OUTPATIENT)
Dept: OBSTETRICS AND GYNECOLOGY | Facility: CLINIC | Age: 38
End: 2023-07-17
Payer: OTHER GOVERNMENT

## 2023-07-28 ENCOUNTER — TELEPHONE (OUTPATIENT)
Dept: OBSTETRICS AND GYNECOLOGY | Facility: CLINIC | Age: 38
End: 2023-07-28
Payer: OTHER GOVERNMENT

## 2023-08-02 ENCOUNTER — PATIENT MESSAGE (OUTPATIENT)
Dept: MATERNAL FETAL MEDICINE | Facility: CLINIC | Age: 38
End: 2023-08-02
Payer: OTHER GOVERNMENT

## 2023-08-07 ENCOUNTER — TELEPHONE (OUTPATIENT)
Dept: OBSTETRICS AND GYNECOLOGY | Facility: CLINIC | Age: 38
End: 2023-08-07
Payer: OTHER GOVERNMENT

## 2023-08-08 ENCOUNTER — ROUTINE PRENATAL (OUTPATIENT)
Dept: OBSTETRICS AND GYNECOLOGY | Facility: CLINIC | Age: 38
End: 2023-08-08
Payer: OTHER GOVERNMENT

## 2023-08-08 VITALS
BODY MASS INDEX: 25.22 KG/M2 | SYSTOLIC BLOOD PRESSURE: 110 MMHG | WEIGHT: 151.56 LBS | DIASTOLIC BLOOD PRESSURE: 72 MMHG

## 2023-08-08 DIAGNOSIS — O09.292 HISTORY OF MISCARRIAGE, CURRENTLY PREGNANT, SECOND TRIMESTER: ICD-10-CM

## 2023-08-08 DIAGNOSIS — Z3A.15 15 WEEKS GESTATION OF PREGNANCY: ICD-10-CM

## 2023-08-08 DIAGNOSIS — O09.521 MULTIGRAVIDA OF ADVANCED MATERNAL AGE IN FIRST TRIMESTER: Primary | ICD-10-CM

## 2023-08-08 PROCEDURE — 0502F PR SUBSEQUENT PRENATAL CARE: ICD-10-PCS | Mod: ,,, | Performed by: OBSTETRICS & GYNECOLOGY

## 2023-08-08 PROCEDURE — 87086 URINE CULTURE/COLONY COUNT: CPT | Performed by: OBSTETRICS & GYNECOLOGY

## 2023-08-08 PROCEDURE — 99213 OFFICE O/P EST LOW 20 MIN: CPT | Mod: PBBFAC | Performed by: OBSTETRICS & GYNECOLOGY

## 2023-08-08 PROCEDURE — 99999 PR PBB SHADOW E&M-EST. PATIENT-LVL III: CPT | Mod: PBBFAC,,, | Performed by: OBSTETRICS & GYNECOLOGY

## 2023-08-08 PROCEDURE — 0502F SUBSEQUENT PRENATAL CARE: CPT | Mod: ,,, | Performed by: OBSTETRICS & GYNECOLOGY

## 2023-08-08 PROCEDURE — 99999 PR PBB SHADOW E&M-EST. PATIENT-LVL III: ICD-10-PCS | Mod: PBBFAC,,, | Performed by: OBSTETRICS & GYNECOLOGY

## 2023-08-08 NOTE — PROGRESS NOTES
Pt had 2 episodes of spotting but only when she urinated.  No blood in underwear.  Pt having mild back and pelvic pain.  Discussed maternity belt.  Pt moving to  [place] 9/30/23.    Assessment/Plan  1. Multigravida of advanced maternal age in first trimester    2. 15 weeks gestation of pregnancy

## 2023-08-09 ENCOUNTER — PATIENT MESSAGE (OUTPATIENT)
Dept: OTHER | Facility: OTHER | Age: 38
End: 2023-08-09
Payer: OTHER GOVERNMENT

## 2023-08-10 LAB — BACTERIA UR CULT: NORMAL

## 2023-08-16 ENCOUNTER — PATIENT MESSAGE (OUTPATIENT)
Dept: OTHER | Facility: OTHER | Age: 38
End: 2023-08-16
Payer: OTHER GOVERNMENT

## 2023-09-06 ENCOUNTER — PATIENT MESSAGE (OUTPATIENT)
Dept: OTHER | Facility: OTHER | Age: 38
End: 2023-09-06
Payer: OTHER GOVERNMENT

## 2023-09-07 ENCOUNTER — PROCEDURE VISIT (OUTPATIENT)
Dept: MATERNAL FETAL MEDICINE | Facility: CLINIC | Age: 38
End: 2023-09-07
Payer: OTHER GOVERNMENT

## 2023-09-07 DIAGNOSIS — Z36.89 ENCOUNTER FOR FETAL ANATOMIC SURVEY: ICD-10-CM

## 2023-09-07 DIAGNOSIS — Z36.2 ENCOUNTER FOR FOLLOW-UP ULTRASOUND OF FETAL ANATOMY: Primary | ICD-10-CM

## 2023-09-07 PROCEDURE — 76811 US MFM PROCEDURE (VIEWPOINT): ICD-10-PCS | Mod: 26,S$PBB,, | Performed by: OBSTETRICS & GYNECOLOGY

## 2023-09-07 PROCEDURE — 76811 OB US DETAILED SNGL FETUS: CPT | Mod: PBBFAC,PO | Performed by: OBSTETRICS & GYNECOLOGY

## 2023-09-08 ENCOUNTER — ROUTINE PRENATAL (OUTPATIENT)
Dept: OBSTETRICS AND GYNECOLOGY | Facility: CLINIC | Age: 38
End: 2023-09-08
Payer: OTHER GOVERNMENT

## 2023-09-08 VITALS — WEIGHT: 166.56 LBS | BODY MASS INDEX: 27.72 KG/M2

## 2023-09-08 DIAGNOSIS — Z3A.20 PREGNANCY WITH 20 COMPLETED WEEKS GESTATION: ICD-10-CM

## 2023-09-08 DIAGNOSIS — O09.522 MULTIGRAVIDA OF ADVANCED MATERNAL AGE IN SECOND TRIMESTER: Primary | ICD-10-CM

## 2023-09-08 PROCEDURE — 99999 PR PBB SHADOW E&M-EST. PATIENT-LVL II: ICD-10-PCS | Mod: PBBFAC,,, | Performed by: OBSTETRICS & GYNECOLOGY

## 2023-09-08 PROCEDURE — 99212 OFFICE O/P EST SF 10 MIN: CPT | Mod: PBBFAC | Performed by: OBSTETRICS & GYNECOLOGY

## 2023-09-08 PROCEDURE — 0502F SUBSEQUENT PRENATAL CARE: CPT | Mod: ,,, | Performed by: OBSTETRICS & GYNECOLOGY

## 2023-09-08 PROCEDURE — 0502F PR SUBSEQUENT PRENATAL CARE: ICD-10-PCS | Mod: ,,, | Performed by: OBSTETRICS & GYNECOLOGY

## 2023-09-08 PROCEDURE — 99999 PR PBB SHADOW E&M-EST. PATIENT-LVL II: CPT | Mod: PBBFAC,,, | Performed by: OBSTETRICS & GYNECOLOGY

## 2023-09-08 NOTE — PROGRESS NOTES
Pt doing well, has been wearing maternity belt and back pain is much improved.  PT moving on 10/2/23 for [place].  Pt has f/u sono on 9/28/23 to complete anatomy scan.  Discussed tylenol pm to help sleep.    Assessment/Plan  1. Multigravida of advanced maternal age in second trimester    2. Pregnancy with 20 completed weeks gestation

## 2023-09-23 ENCOUNTER — ANESTHESIA EVENT (OUTPATIENT)
Dept: OBSTETRICS AND GYNECOLOGY | Facility: OTHER | Age: 38
DRG: 819 | End: 2023-09-23
Payer: OTHER GOVERNMENT

## 2023-09-23 ENCOUNTER — HOSPITAL ENCOUNTER (INPATIENT)
Facility: OTHER | Age: 38
LOS: 1 days | Discharge: HOME | DRG: 819 | End: 2023-09-25
Attending: OBSTETRICS & GYNECOLOGY | Admitting: OBSTETRICS & GYNECOLOGY
Payer: OTHER GOVERNMENT

## 2023-09-23 DIAGNOSIS — O34.30 CERVICAL INSUFFICIENCY DURING PREGNANCY, ANTEPARTUM: ICD-10-CM

## 2023-09-23 DIAGNOSIS — O34.32 CERVICAL CERCLAGE SUTURE PRESENT IN SECOND TRIMESTER: Primary | ICD-10-CM

## 2023-09-23 PROBLEM — Z3A.22 22 WEEKS GESTATION OF PREGNANCY: Status: ACTIVE | Noted: 2023-09-23

## 2023-09-23 PROBLEM — Z87.51 HISTORY OF PRETERM DELIVERY: Status: ACTIVE | Noted: 2023-09-23

## 2023-09-23 PROBLEM — N96 RECURRENT PREGNANCY LOSS: Status: ACTIVE | Noted: 2023-09-23

## 2023-09-23 LAB
ABO + RH BLD: NORMAL
BACTERIA #/AREA URNS HPF: ABNORMAL /HPF
BACTERIA GENITAL QL WET PREP: ABNORMAL
BASOPHILS # BLD AUTO: 0.05 K/UL (ref 0–0.2)
BASOPHILS NFR BLD: 0.3 % (ref 0–1.9)
BILIRUB UR QL STRIP: NEGATIVE
BLD GP AB SCN CELLS X3 SERPL QL: NORMAL
CLARITY UR: ABNORMAL
CLUE CELLS VAG QL WET PREP: ABNORMAL
COLOR UR: YELLOW
DIFFERENTIAL METHOD: ABNORMAL
EOSINOPHIL # BLD AUTO: 0 K/UL (ref 0–0.5)
EOSINOPHIL NFR BLD: 0.2 % (ref 0–8)
ERYTHROCYTE [DISTWIDTH] IN BLOOD BY AUTOMATED COUNT: 13.9 % (ref 11.5–14.5)
FILAMENT FUNGI VAG WET PREP-#/AREA: ABNORMAL
GLUCOSE UR QL STRIP: NEGATIVE
HCT VFR BLD AUTO: 34.7 % (ref 37–48.5)
HGB BLD-MCNC: 11.6 G/DL (ref 12–16)
HGB UR QL STRIP: ABNORMAL
IMM GRANULOCYTES # BLD AUTO: 0.47 K/UL (ref 0–0.04)
IMM GRANULOCYTES NFR BLD AUTO: 2.9 % (ref 0–0.5)
KETONES UR QL STRIP: NEGATIVE
LEUKOCYTE ESTERASE UR QL STRIP: ABNORMAL
LYMPHOCYTES # BLD AUTO: 1.9 K/UL (ref 1–4.8)
LYMPHOCYTES NFR BLD: 12 % (ref 18–48)
MCH RBC QN AUTO: 29.6 PG (ref 27–31)
MCHC RBC AUTO-ENTMCNC: 33.4 G/DL (ref 32–36)
MCV RBC AUTO: 89 FL (ref 82–98)
MICROSCOPIC COMMENT: ABNORMAL
MONOCYTES # BLD AUTO: 0.8 K/UL (ref 0.3–1)
MONOCYTES NFR BLD: 4.8 % (ref 4–15)
NEUTROPHILS # BLD AUTO: 12.7 K/UL (ref 1.8–7.7)
NEUTROPHILS NFR BLD: 79.8 % (ref 38–73)
NITRITE UR QL STRIP: NEGATIVE
NRBC BLD-RTO: 0 /100 WBC
PH UR STRIP: 8 [PH] (ref 5–8)
PLATELET # BLD AUTO: 302 K/UL (ref 150–450)
PMV BLD AUTO: 9.9 FL (ref 9.2–12.9)
PROT UR QL STRIP: NEGATIVE
RBC # BLD AUTO: 3.92 M/UL (ref 4–5.4)
RBC #/AREA URNS HPF: 4 /HPF (ref 0–4)
SP GR UR STRIP: 1.01 (ref 1–1.03)
SPECIMEN OUTDATE: NORMAL
SPECIMEN SOURCE: ABNORMAL
SQUAMOUS #/AREA URNS HPF: 6 /HPF
T VAGINALIS GENITAL QL WET PREP: ABNORMAL
URN SPEC COLLECT METH UR: ABNORMAL
UROBILINOGEN UR STRIP-ACNC: NEGATIVE EU/DL
WBC # BLD AUTO: 15.97 K/UL (ref 3.9–12.7)
WBC #/AREA URNS HPF: 51 /HPF (ref 0–5)
WBC #/AREA VAG WET PREP: ABNORMAL
WBC CLUMPS URNS QL MICRO: ABNORMAL
YEAST GENITAL QL WET PREP: ABNORMAL

## 2023-09-23 PROCEDURE — 87086 URINE CULTURE/COLONY COUNT: CPT | Performed by: OBSTETRICS & GYNECOLOGY

## 2023-09-23 PROCEDURE — 85025 COMPLETE CBC W/AUTO DIFF WBC: CPT

## 2023-09-23 PROCEDURE — 99223 1ST HOSP IP/OBS HIGH 75: CPT | Mod: ,,, | Performed by: OBSTETRICS & GYNECOLOGY

## 2023-09-23 PROCEDURE — 87210 SMEAR WET MOUNT SALINE/INK: CPT | Performed by: OBSTETRICS & GYNECOLOGY

## 2023-09-23 PROCEDURE — 86900 BLOOD TYPING SEROLOGIC ABO: CPT

## 2023-09-23 PROCEDURE — 99211 OFF/OP EST MAY X REQ PHY/QHP: CPT

## 2023-09-23 PROCEDURE — 87591 N.GONORRHOEAE DNA AMP PROB: CPT | Performed by: OBSTETRICS & GYNECOLOGY

## 2023-09-23 PROCEDURE — 81000 URINALYSIS NONAUTO W/SCOPE: CPT | Performed by: OBSTETRICS & GYNECOLOGY

## 2023-09-23 PROCEDURE — 99223 PR INITIAL HOSPITAL CARE,LEVL III: ICD-10-PCS | Mod: ,,, | Performed by: OBSTETRICS & GYNECOLOGY

## 2023-09-23 RX ORDER — ONDANSETRON 8 MG/1
8 TABLET, ORALLY DISINTEGRATING ORAL EVERY 8 HOURS PRN
Status: DISCONTINUED | OUTPATIENT
Start: 2023-09-23 | End: 2023-09-25 | Stop reason: HOSPADM

## 2023-09-23 RX ORDER — PROCHLORPERAZINE EDISYLATE 5 MG/ML
5 INJECTION INTRAMUSCULAR; INTRAVENOUS EVERY 6 HOURS PRN
Status: DISCONTINUED | OUTPATIENT
Start: 2023-09-23 | End: 2023-09-25 | Stop reason: HOSPADM

## 2023-09-23 RX ORDER — ACETAMINOPHEN 325 MG/1
650 TABLET ORAL EVERY 6 HOURS PRN
Status: DISCONTINUED | OUTPATIENT
Start: 2023-09-23 | End: 2023-09-25 | Stop reason: HOSPADM

## 2023-09-23 RX ORDER — PRENATAL WITH FERROUS FUM AND FOLIC ACID 3080; 920; 120; 400; 22; 1.84; 3; 20; 10; 1; 12; 200; 27; 25; 2 [IU]/1; [IU]/1; MG/1; [IU]/1; MG/1; MG/1; MG/1; MG/1; MG/1; MG/1; UG/1; MG/1; MG/1; MG/1; MG/1
1 TABLET ORAL DAILY
Status: DISCONTINUED | OUTPATIENT
Start: 2023-09-24 | End: 2023-09-25 | Stop reason: HOSPADM

## 2023-09-23 RX ORDER — AMOXICILLIN 250 MG
1 CAPSULE ORAL NIGHTLY PRN
Status: DISCONTINUED | OUTPATIENT
Start: 2023-09-23 | End: 2023-09-25 | Stop reason: HOSPADM

## 2023-09-23 RX ORDER — SODIUM CHLORIDE, SODIUM LACTATE, POTASSIUM CHLORIDE, CALCIUM CHLORIDE 600; 310; 30; 20 MG/100ML; MG/100ML; MG/100ML; MG/100ML
INJECTION, SOLUTION INTRAVENOUS CONTINUOUS
Status: DISCONTINUED | OUTPATIENT
Start: 2023-09-24 | End: 2023-09-24

## 2023-09-23 RX ORDER — DIPHENHYDRAMINE HYDROCHLORIDE 50 MG/ML
25 INJECTION INTRAMUSCULAR; INTRAVENOUS EVERY 4 HOURS PRN
Status: DISCONTINUED | OUTPATIENT
Start: 2023-09-23 | End: 2023-09-25 | Stop reason: HOSPADM

## 2023-09-23 RX ORDER — SIMETHICONE 80 MG
1 TABLET,CHEWABLE ORAL EVERY 6 HOURS PRN
Status: DISCONTINUED | OUTPATIENT
Start: 2023-09-23 | End: 2023-09-25 | Stop reason: HOSPADM

## 2023-09-23 RX ORDER — SODIUM CHLORIDE 0.9 % (FLUSH) 0.9 %
10 SYRINGE (ML) INJECTION
Status: DISCONTINUED | OUTPATIENT
Start: 2023-09-23 | End: 2023-09-25 | Stop reason: HOSPADM

## 2023-09-23 RX ORDER — DIPHENHYDRAMINE HCL 25 MG
25 CAPSULE ORAL EVERY 4 HOURS PRN
Status: DISCONTINUED | OUTPATIENT
Start: 2023-09-23 | End: 2023-09-25 | Stop reason: HOSPADM

## 2023-09-23 RX ADMIN — SODIUM CHLORIDE, POTASSIUM CHLORIDE, SODIUM LACTATE AND CALCIUM CHLORIDE: 600; 310; 30; 20 INJECTION, SOLUTION INTRAVENOUS at 07:09

## 2023-09-23 NOTE — ASSESSMENT & PLAN NOTE
- Stat US done with reports of normal placenta but cervical dilation  - Diet: NPO  - Discussed diagnosis with MFM (Dr. Jain) then with patient and . Offered the possibility of rescue cerclage placed but no guarantees as she would need to be transferred and evaluated at [facility].

## 2023-09-23 NOTE — HPI
Janki Hilton is a 38 y.o.  female with IUP at 22w3d measured by LMP consistent with 6 week ultrasound with significant hx of low lying placenta and history of recurrent pregnancy loss, and pregnancy loss at 22w0d  who is being admitted for cervical insufficiency. Patient reports that she went to use the restroom this morning and when she wiped she saw BRB. She denies LOF and contractions at this time. Upon admission, overt, vaginal bleeding was noted but seen on q-tip upon vaginal swab.     Patient denies contractions, reports vaginal bleeding, denies LOF.   Fetal Movement: normal.

## 2023-09-23 NOTE — NURSING
0841 - 38 y.o. 22w3d  presents to the unit with c/o of vaginal bleeding that started around 30 minutes prior to coming in. No blood visualized during initial assessment. FHT 145s-150s via ultrasound. No ctxs on the [place]. Full assessment done. History and medications reviewed. Pt reports good fetal movement. Denies contractions, abdominal pain. POC reviewed with pt, verbalizes understanding. Will continue to monitor pt.     0900 - Notified Dr. Durbin notified of the above information. Orders placed for wet prep, ghonorhea/chlamydia swab, and to send of the UA.     0916- mucus red blood noted as swabs were being collected. Dr. Durbin notified.    1212 - Ultrasound results reviewed by MD. Dr. Durbin at the bedside. JAGDISHE 3/90/-3.      1251 - M consulted and patient will be transferred to [facility]. House Supervisor notified     1435 - Report called to MICHELE Daniels at [facility]     1530 - Patient picked up by [facility] to be transported to [facility]. NAD noted.

## 2023-09-23 NOTE — PLAN OF CARE
Pt transferred from [facility] via transport; arrived on stretcher. O/A conscious and alert, breathing spontaneously on room air; in no obvious distress.   RONNELL placed.  VSS  Pt reviewed at bedside by MD. Consented. See orders.  FHT confirmed. [place] cont.  Maternal-fetal well-being maintained this shift, as evidence by nursing assessments and interventions charted.   Education, advice and reassurance given.   Pt cooperative and tolerated well.   N.P.O at MN; for OT bethanie. Management continues.

## 2023-09-23 NOTE — H&P
[facility] [place] - [place] & Delivery  Obstetrics  History & Physical    Patient Name: Janki Hilton  MRN: [id number]  Admission Date: 2023  Primary Care Provider: Enrique Bal Jr., MD    Subjective:     Principal Problem:Cervical insufficiency during pregnancy, antepartum    History of Present Illness:   Janki Hilton is a 38 y.o.  female with IUP at 22w3d measured by LMP consistent with 6 week ultrasound with significant hx of low lying placenta and history of recurrent pregnancy loss, and pregnancy loss at 22w0d  who is being admitted for cervical insufficiency. Patient reports that she went to use the restroom this morning and when she wiped she saw BRB. She denies LOF and contractions at this time. Upon admission, overt, vaginal bleeding was noted but seen on q-tip upon vaginal swab.     Patient denies contractions, reports vaginal bleeding, denies LOF.   Fetal Movement: normal.         OB History    Para Term  AB Living   5 1 0 1 3 0   SAB IAB Ectopic Multiple Live Births   3 0 0 0 0      # Outcome Date GA Lbr Harpal/2nd Weight Sex Delivery Anes PTL Lv   5 Current            4 SAB            3 SAB            2 SAB            1   22w0d            No past medical history on file.  No past surgical history on file.    PTA Medications   Medication Sig    acetaminophen (TYLENOL) 500 MG tablet Take 1 tablet (500 mg total) by mouth every 6 (six) hours as needed for Pain.    aspirin (ECOTRIN) 81 MG EC tablet Take 1 tablet (81 mg total) by mouth once daily.    ketoconazole (NIZORAL) 2 % shampoo Apply to scalp, lather and leave on for 10 minutes, then rinse. Apply 3 times a week    metFORMIN (GLUCOPHAGE-XR) 750 MG ER 24hr tablet Take 2 tablets (1,500 mg total) by mouth daily with breakfast.    metFORMIN (GLUCOPHAGE-XR) 750 MG ER 24hr tablet   See Instructions, # 180 EA, 3 total refill(s), Hard StopBURT [Federal Rx: #180 last filled 23]    PNV,calcium 72-iron-folic acid (PRENATAL  VITAMIN PLUS LOW IRON) 27 mg iron- 1 mg Tab Take one tablet daily.  Prescribe prenatal covered by insurance    prenatal vit-iron fum-folic ac 65 mg iron- 1 mg Tab Take by mouth.    progesterone (PROMETRIUM) 200 MG capsule Take 1 capsule (200 mg total) by mouth nightly.    progesterone (PROMETRIUM) 200 MG capsule Take 200 mg by mouth.       Review of patient's allergies indicates:  No Known Allergies     Family History    None       Tobacco Use    Smoking status: Never    Smokeless tobacco: Never   Substance and Sexual Activity    Alcohol use: Yes     Comment: occasional     Drug use: Never    Sexual activity: Yes     Partners: Male     Birth control/protection: None     Review of Systems   Constitutional:  Negative for chills and fever.   Eyes:  Negative for visual disturbance.   Respiratory:  Negative for cough and wheezing.    Cardiovascular:  Negative for chest pain and palpitations.   Gastrointestinal:  Negative for abdominal pain, nausea and vomiting.   Genitourinary:  Positive for vaginal bleeding. Negative for dysuria, frequency, hematuria, pelvic pain, vaginal discharge and vaginal pain.   Neurological:  Negative for headaches.   Psychiatric/Behavioral:  Negative for depression.       Objective:     Vital Signs (Most Recent):  Temp: 99 °F (37.2 °C) (09/23/23 0841)  Pulse: 90 (09/23/23 0841)  Resp: 16 (09/23/23 0841)  BP: 129/78 (09/23/23 0841)  SpO2: 100 % (09/23/23 0841) Vital Signs (24h Range):  Temp:  [99 °F (37.2 °C)] 99 °F (37.2 °C)  Pulse:  [90] 90  Resp:  [16] 16  SpO2:  [100 %] 100 %  BP: (129)/(78) 129/78        There is no height or weight on file to calculate BMI.    FHT: 145-150 BPM  TOCO:  None     Physical Exam:   Constitutional: She is oriented to person, place, and time. She appears well-developed and well-nourished. No distress.       Cardiovascular:  Normal rate.             Pulmonary/Chest: Effort normal.        Abdominal: Soft. She exhibits no distension.     Genitourinary: There  "is vaginal discharge in the vagina.    Genitourinary Comments: Small amount of vaginal bleeding upon exam                 Neurological: She is alert and oriented to person, place, and time.    Skin: Skin is warm and dry.    Psychiatric: She has a normal mood and affect.        Cervix:  Dilation:  3  Effacement:  90%  Station: -3  Presentation: Breech     Significant Labs:  Lab Results   Component Value Date    GROUPTRH O POS 2023    HEPBSAG Non-reactive 2023       CBC: No results for input(s): "WBC", "RBC", "HGB", "HCT", "PLT", "MCV", "MCH", "MCHC" in the last 48 hours.  Recent Labs   Lab 23  0926   COLORU Yellow   SPECGRAV 1.010   PHUR 8.0   PROTEINUA Negative   BACTERIA Occasional   NITRITE Negative   LEUKOCYTESUR 3+*   UROBILINOGEN Negative     I have personallly reviewed all pertinent lab results from the last 24 hours.    Assessment/Plan:     38 y.o. female  at 22w3d for:    * Cervical insufficiency during pregnancy, antepartum  - Stat US done with reports of normal placenta but cervical dilation  - Diet: NPO  - Discussed diagnosis with MFM (Dr. Jain) then with patient and . Offered the possibility of rescue cerclage placed but no guarantees as she would need to be transferred and evaluated at [facility].      Suzan Durbin MD  Obstetrics  [facility] - Labor & Delivery      "

## 2023-09-23 NOTE — SUBJECTIVE & OBJECTIVE
OB History    Para Term  AB Living   5 1 0 1 3 0   SAB IAB Ectopic Multiple Live Births   3 0 0 0 0      # Outcome Date GA Lbr Harpal/2nd Weight Sex Delivery Anes PTL Lv   5 Current            4 SAB            3 SAB            2 SAB            1   22w0d            No past medical history on file.  No past surgical history on file.    PTA Medications   Medication Sig    acetaminophen (TYLENOL) 500 MG tablet Take 1 tablet (500 mg total) by mouth every 6 (six) hours as needed for Pain.    aspirin (ECOTRIN) 81 MG EC tablet Take 1 tablet (81 mg total) by mouth once daily.    ketoconazole (NIZORAL) 2 % shampoo Apply to scalp, lather and leave on for 10 minutes, then rinse. Apply 3 times a week    metFORMIN (GLUCOPHAGE-XR) 750 MG ER 24hr tablet Take 2 tablets (1,500 mg total) by mouth daily with breakfast.    metFORMIN (GLUCOPHAGE-XR) 750 MG ER 24hr tablet   See Instructions, # 180 EA, 3 total refill(s), Hard Stop, BURT [Federal Rx: #180 last filled 23]    PNV,calcium 72-iron-folic acid (PRENATAL VITAMIN PLUS LOW IRON) 27 mg iron- 1 mg Tab Take one tablet daily.  Prescribe prenatal covered by insurance    prenatal vit-iron fum-folic ac 65 mg iron- 1 mg Tab Take by mouth.    progesterone (PROMETRIUM) 200 MG capsule Take 1 capsule (200 mg total) by mouth nightly.    progesterone (PROMETRIUM) 200 MG capsule Take 200 mg by mouth.       Review of patient's allergies indicates:  No Known Allergies     Family History    None       Tobacco Use    Smoking status: Never    Smokeless tobacco: Never   Substance and Sexual Activity    Alcohol use: Yes     Comment: occasional     Drug use: Never    Sexual activity: Yes     Partners: Male     Birth control/protection: None     Review of Systems   Constitutional:  Negative for chills and fever.   Eyes:  Negative for visual disturbance.   Respiratory:  Negative for cough and wheezing.    Cardiovascular:  Negative for chest pain and palpitations.   Gastrointestinal:   "Negative for abdominal pain, nausea and vomiting.   Genitourinary:  Positive for vaginal bleeding. Negative for dysuria, frequency, hematuria, pelvic pain, vaginal discharge and vaginal pain.   Neurological:  Negative for headaches.   Psychiatric/Behavioral:  Negative for depression.       Objective:     Vital Signs (Most Recent):  Temp: 99 °F (37.2 °C) (09/23/23 0841)  Pulse: 90 (09/23/23 0841)  Resp: 16 (09/23/23 0841)  BP: 129/78 (09/23/23 0841)  SpO2: 100 % (09/23/23 0841) Vital Signs (24h Range):  Temp:  [99 °F (37.2 °C)] 99 °F (37.2 °C)  Pulse:  [90] 90  Resp:  [16] 16  SpO2:  [100 %] 100 %  BP: (129)/(78) 129/78        There is no height or weight on file to calculate BMI.    FHT: 145-150 BPM  TOCO:  None     Physical Exam:   Constitutional: She is oriented to person, place, and time. She appears well-developed and well-nourished. No distress.       Cardiovascular:  Normal rate.             Pulmonary/Chest: Effort normal.        Abdominal: Soft. She exhibits no distension.     Genitourinary: There is vaginal discharge in the vagina.    Genitourinary Comments: Small amount of vaginal bleeding upon exam                 Neurological: She is alert and oriented to person, place, and time.    Skin: Skin is warm and dry.    Psychiatric: She has a normal mood and affect.        Cervix:  Dilation:  3  Effacement:  90%  Station: -3  Presentation: Breech     Significant Labs:  Lab Results   Component Value Date    GROUPTRH O POS 06/05/2023    HEPBSAG Non-reactive 05/29/2023       CBC: No results for input(s): "WBC", "RBC", "HGB", "HCT", "PLT", "MCV", "MCH", "MCHC" in the last 48 hours.  Recent Labs   Lab 09/23/23  0926   COLORU Yellow   SPECGRAV 1.010   PHUR 8.0   PROTEINUA Negative   BACTERIA Occasional   NITRITE Negative   LEUKOCYTESUR 3+*   UROBILINOGEN Negative     I have personallly reviewed all pertinent lab results from the last 24 hours.  "

## 2023-09-24 ENCOUNTER — ANESTHESIA (OUTPATIENT)
Dept: OBSTETRICS AND GYNECOLOGY | Facility: OTHER | Age: 38
DRG: 819 | End: 2023-09-24
Payer: OTHER GOVERNMENT

## 2023-09-24 LAB
C TRACH DNA SPEC QL NAA+PROBE: NOT DETECTED
N GONORRHOEA DNA SPEC QL NAA+PROBE: NOT DETECTED

## 2023-09-24 PROCEDURE — 25000003 PHARM REV CODE 250

## 2023-09-24 PROCEDURE — 36004722: Performed by: OBSTETRICS & GYNECOLOGY

## 2023-09-24 PROCEDURE — 59320 PR REVISION CERVIX W PREG,VAG APPRCH: ICD-10-PCS | Mod: ,,, | Performed by: OBSTETRICS & GYNECOLOGY

## 2023-09-24 PROCEDURE — 36004723: Performed by: OBSTETRICS & GYNECOLOGY

## 2023-09-24 PROCEDURE — 63600175 PHARM REV CODE 636 W HCPCS

## 2023-09-24 PROCEDURE — 59320 REVISION OF CERVIX: CPT | Mod: ,,, | Performed by: OBSTETRICS & GYNECOLOGY

## 2023-09-24 PROCEDURE — 71000039 HC RECOVERY, EACH ADD'L HOUR: Performed by: OBSTETRICS & GYNECOLOGY

## 2023-09-24 PROCEDURE — D9220A PRA ANESTHESIA: Mod: ,,, | Performed by: ANESTHESIOLOGY

## 2023-09-24 PROCEDURE — 37000009 HC ANESTHESIA EA ADD 15 MINS: Performed by: OBSTETRICS & GYNECOLOGY

## 2023-09-24 PROCEDURE — 11000001 HC ACUTE MED/SURG PRIVATE ROOM

## 2023-09-24 PROCEDURE — 63700000 PHARM REV CODE 250 ALT 637 W/O HCPCS

## 2023-09-24 PROCEDURE — 25000003 PHARM REV CODE 250: Performed by: OBSTETRICS & GYNECOLOGY

## 2023-09-24 PROCEDURE — 71000033 HC RECOVERY, INTIAL HOUR: Performed by: OBSTETRICS & GYNECOLOGY

## 2023-09-24 PROCEDURE — 37000008 HC ANESTHESIA 1ST 15 MINUTES: Performed by: OBSTETRICS & GYNECOLOGY

## 2023-09-24 PROCEDURE — 63600175 PHARM REV CODE 636 W HCPCS: Performed by: STUDENT IN AN ORGANIZED HEALTH CARE EDUCATION/TRAINING PROGRAM

## 2023-09-24 PROCEDURE — D9220A PRA ANESTHESIA: ICD-10-PCS | Mod: ,,, | Performed by: ANESTHESIOLOGY

## 2023-09-24 RX ORDER — FENTANYL CITRATE 50 UG/ML
INJECTION, SOLUTION INTRAMUSCULAR; INTRAVENOUS
Status: DISCONTINUED | OUTPATIENT
Start: 2023-09-24 | End: 2023-09-24

## 2023-09-24 RX ORDER — METRONIDAZOLE 500 MG/1
500 TABLET ORAL
Status: DISCONTINUED | OUTPATIENT
Start: 2023-09-24 | End: 2023-09-25 | Stop reason: HOSPADM

## 2023-09-24 RX ORDER — AZITHROMYCIN 250 MG/1
500 TABLET, FILM COATED ORAL DAILY
Status: DISCONTINUED | OUTPATIENT
Start: 2023-09-24 | End: 2023-09-25 | Stop reason: HOSPADM

## 2023-09-24 RX ORDER — PHENYLEPHRINE HYDROCHLORIDE 10 MG/ML
INJECTION INTRAVENOUS
Status: DISCONTINUED | OUTPATIENT
Start: 2023-09-24 | End: 2023-09-24

## 2023-09-24 RX ORDER — SODIUM CHLORIDE, SODIUM LACTATE, POTASSIUM CHLORIDE, CALCIUM CHLORIDE 600; 310; 30; 20 MG/100ML; MG/100ML; MG/100ML; MG/100ML
INJECTION, SOLUTION INTRAVENOUS CONTINUOUS PRN
Status: DISCONTINUED | OUTPATIENT
Start: 2023-09-24 | End: 2023-09-24

## 2023-09-24 RX ORDER — SODIUM CITRATE AND CITRIC ACID MONOHYDRATE 334; 500 MG/5ML; MG/5ML
30 SOLUTION ORAL ONCE
Status: COMPLETED | OUTPATIENT
Start: 2023-09-24 | End: 2023-09-24

## 2023-09-24 RX ORDER — INDOMETHACIN 25 MG/1
50 CAPSULE ORAL ONCE
Status: COMPLETED | OUTPATIENT
Start: 2023-09-24 | End: 2023-09-24

## 2023-09-24 RX ORDER — CEPHALEXIN 500 MG/1
500 CAPSULE ORAL EVERY 8 HOURS
Status: DISCONTINUED | OUTPATIENT
Start: 2023-09-24 | End: 2023-09-25 | Stop reason: HOSPADM

## 2023-09-24 RX ORDER — INDOMETHACIN 25 MG/1
25 CAPSULE ORAL
Status: COMPLETED | OUTPATIENT
Start: 2023-09-24 | End: 2023-09-25

## 2023-09-24 RX ORDER — FAMOTIDINE 10 MG/ML
20 INJECTION INTRAVENOUS
Status: COMPLETED | OUTPATIENT
Start: 2023-09-24 | End: 2023-09-24

## 2023-09-24 RX ORDER — CEFAZOLIN SODIUM 1 G/3ML
INJECTION, POWDER, FOR SOLUTION INTRAMUSCULAR; INTRAVENOUS
Status: DISCONTINUED | OUTPATIENT
Start: 2023-09-24 | End: 2023-09-24

## 2023-09-24 RX ADMIN — PHENYLEPHRINE HYDROCHLORIDE 50 MCG: 10 INJECTION INTRAVENOUS at 09:09

## 2023-09-24 RX ADMIN — CEPHALEXIN 500 MG: 500 CAPSULE ORAL at 09:09

## 2023-09-24 RX ADMIN — INDOMETHACIN 50 MG: 25 CAPSULE ORAL at 10:09

## 2023-09-24 RX ADMIN — CEPHALEXIN 500 MG: 500 CAPSULE ORAL at 02:09

## 2023-09-24 RX ADMIN — SODIUM CITRATE AND CITRIC ACID MONOHYDRATE 30 ML: 500; 334 SOLUTION ORAL at 08:09

## 2023-09-24 RX ADMIN — METRONIDAZOLE 500 MG: 500 TABLET ORAL at 10:09

## 2023-09-24 RX ADMIN — SODIUM CHLORIDE, SODIUM LACTATE, POTASSIUM CHLORIDE, AND CALCIUM CHLORIDE: 600; 310; 30; 20 INJECTION, SOLUTION INTRAVENOUS at 08:09

## 2023-09-24 RX ADMIN — FAMOTIDINE 20 MG: 10 INJECTION, SOLUTION INTRAVENOUS at 08:09

## 2023-09-24 RX ADMIN — FENTANYL CITRATE 10 MCG: 50 INJECTION, SOLUTION INTRAMUSCULAR; INTRAVENOUS at 09:09

## 2023-09-24 RX ADMIN — AZITHROMYCIN MONOHYDRATE 500 MG: 250 TABLET ORAL at 10:09

## 2023-09-24 RX ADMIN — PRENATAL VIT W/ FE FUMARATE-FA TAB 27-0.8 MG 1 TABLET: 27-0.8 TAB at 10:09

## 2023-09-24 RX ADMIN — METRONIDAZOLE 500 MG: 500 TABLET ORAL at 09:09

## 2023-09-24 RX ADMIN — SODIUM CHLORIDE, POTASSIUM CHLORIDE, SODIUM LACTATE AND CALCIUM CHLORIDE: 600; 310; 30; 20 INJECTION, SOLUTION INTRAVENOUS at 12:09

## 2023-09-24 RX ADMIN — INDOMETHACIN 25 MG: 25 CAPSULE ORAL at 09:09

## 2023-09-24 RX ADMIN — MEPIVACAINE HYDROCHLORIDE 3 ML: 15 INJECTION, SOLUTION EPIDURAL; INFILTRATION at 09:09

## 2023-09-24 RX ADMIN — INDOMETHACIN 25 MG: 25 CAPSULE ORAL at 04:09

## 2023-09-24 RX ADMIN — CEFAZOLIN 2 G: 330 INJECTION, POWDER, FOR SOLUTION INTRAMUSCULAR; INTRAVENOUS at 09:09

## 2023-09-24 NOTE — NURSING
1910: RN called to room, noted vaginal bleeding running down the pt's leg as she was trying to get up and go to the bathroom. Pt was not able to get to the bathroom and assisted back to bed. Pt described pain contraction as having period cramp-like pain and rated about 3/10. Estimated amount of bleeding was about 3-5 mL as noted on the tissue paper and bedsheet. Noted to have mild contractions every 2-4 mins. RN notified MD immediately.     1920: MD (Dr. Avendano) at bedside, explanation given about current situation. Questions answered, concerns addressed as of the moment. Bolus of LR's 500 mL given as ordered.    Will continue to monitor closely for occurrence of profuse vaginal bleeding and frequency of contraction. Placed on continuous toco. Maternity pad in place to approximate bleeding.

## 2023-09-24 NOTE — PLAN OF CARE
Problem: Adult Inpatient Plan of Care  Goal: Plan of Care Review  Outcome: Ongoing, Progressing     Problem: Adult Inpatient Plan of Care  Goal: Absence of Hospital-Acquired Illness or Injury  Outcome: Ongoing, Progressing     Problem: Adult Inpatient Plan of Care  Goal: Optimal Comfort and Wellbeing  Outcome: Ongoing, Progressing     Problem: Maternal-Fetal Wellbeing  Goal: Optimal Maternal-Fetal Wellbeing  Outcome: Ongoing, Progressing     Problem:  Fall Injury Risk  Goal: Absence of Fall, Infant Drop and Related Injury  Outcome: Ongoing, Progressing     Problem:  Labor  Goal: Delayed  Delivery  Outcome: Ongoing, Progressing     Problem: Bleeding (Cervical Cerclage)  Goal: Absence of Bleeding  Outcome: Ongoing, Progressing     Problem: Infection (Cervical Cerclage)  Goal: Absence of Infection Signs and Symptoms  Outcome: Ongoing, Progressing

## 2023-09-24 NOTE — NURSING
Up to bathroom with minimal assistance. Able to void. Nicolasa care, underwear, and pads provided. Linens and gown changed.

## 2023-09-24 NOTE — CARE UPDATE
Dr. Jain and myself to bedside.       S: Patient reports decreased cramping and some mild vaginal spotting.     O: SSE performed. Visually cervix noted to be about 3cm dilated with membranes visible at external os.     A/P: Lengthy discussion with patient and partner of r/b/a of rescue cerclage. Patient strongly desires cerclage. Consents signed and to the chart. Ancef OCTOR. Will do tocolysis with indocin 24hrs s/p cerclage. To OR.       Kylie Waite MD PGY-2  Obstetrics and Gynecology  [facility]

## 2023-09-24 NOTE — PLAN OF CARE
Problem: Adult Inpatient Plan of Care  Goal: Plan of Care Review  Outcome: Ongoing, Progressing  Goal: Patient-Specific Goal (Individualized)  Outcome: Ongoing, Progressing  Goal: Absence of Hospital-Acquired Illness or Injury  Outcome: Ongoing, Progressing  Goal: Optimal Comfort and Wellbeing  Outcome: Ongoing, Progressing  Goal: Readiness for Transition of Care  Outcome: Ongoing, Progressing     Problem:  Fall Injury Risk  Goal: Absence of Fall, Infant Drop and Related Injury  Outcome: Ongoing, Progressing     Problem: Maternal-Fetal Wellbeing  Goal: Optimal Maternal-Fetal Wellbeing  Outcome: Ongoing, Progressing     Problem:  Labor  Goal: Delayed  Delivery  Outcome: Ongoing, Progressing     Problem: Pain Acute  Goal: Acceptable Pain Control and Functional Ability  Outcome: Ongoing, Progressing

## 2023-09-24 NOTE — ANESTHESIA PREPROCEDURE EVALUATION
[facility]  Anesthesia Pre-Operative Evaluation         Patient Name: Janki Hilton  YOB: 1985  MRN: [id number]    2023      Janki Hilton is a 38 y.o. female  @ 22w3d who presents due to cervical insufficiency vs.  labor. Admitted for monitoring and possible cerclage in the morning. Denies cardiopulmonary, bleeding, or spine disorders.     IUP c/b low lying placenta, h/o recurrent pregnancy loss, and AMA.    OB History    Para Term  AB Living   5 1   1 3     SAB IAB Ectopic Multiple Live Births   3              # Outcome Date GA Lbr Harpal/2nd Weight Sex Delivery Anes PTL Lv   5 Current            4 SAB            3 SAB            2 SAB            1   22w0d              Review of patient's allergies indicates:  No Known Allergies    Wt Readings from Last 1 Encounters:   23 0837 75.5 kg (166 lb 7.2 oz)       BP Readings from Last 3 Encounters:   23 118/69   23 110/72   23 108/72       Patient Active Problem List   Diagnosis    PCO (polycystic ovaries)    History of miscarriage, currently pregnant, second trimester    Multigravida of advanced maternal age in first trimester    Cervical insufficiency during pregnancy, antepartum    Recurrent pregnancy loss    22 weeks gestation of pregnancy    History of  delivery       No past surgical history on file.    Social History     Socioeconomic History    Marital status:    Tobacco Use    Smoking status: Never    Smokeless tobacco: Never   Substance and Sexual Activity    Alcohol use: Yes     Comment: occasional     Drug use: Never    Sexual activity: Yes     Partners: Male     Birth control/protection: None         Chemistry        Component Value Date/Time     (L) 2023 1645    K 3.6 2023 1645     2023 1645    CO2 23 2023 1645    BUN 5 (L) 2023 1645    CREATININE 0.6 2023 1645    GLU 99 2023 1645      "   Component Value Date/Time    CALCIUM 9.9 07/06/2023 1645    ALKPHOS 75 07/06/2023 1645    AST 16 07/06/2023 1645    ALT 15 07/06/2023 1645    BILITOT 0.4 07/06/2023 1645    ESTGFRAFRICA >60 10/18/2021 0943    EGFRNONAA >60 10/18/2021 0943            Lab Results   Component Value Date    WBC 15.97 (H) 09/23/2023    HGB 11.6 (L) 09/23/2023    HCT 34.7 (L) 09/23/2023    MCV 89 09/23/2023     09/23/2023       No results for input(s): "PT", "INR", "PROTIME", "APTT" in the last 72 hours.                                                                                                                     09/23/2023  Janki Hilton is a 38 y.o., female.      Pre-op Assessment    I have reviewed the Patient Summary Reports.     I have reviewed the Nursing Notes.    I have reviewed the Medications.     Review of Systems  Anesthesia Hx:  No problems with previous Anesthesia  History of prior surgery of interest to airway management or planning: Denies Family Hx of Anesthesia complications.   Denies Personal Hx of Anesthesia complications.   Hematology/Oncology:     Oncology Normal     Cardiovascular:   Denies Hypertension.   Denies Angina. ECG has been reviewed.    Pulmonary:   Denies Shortness of breath.  Denies Recent URI.    Renal/:  Renal/ Normal     Hepatic/GI:   Denies GERD. Denies Liver Disease.    Neurological:   Denies CVA. Denies Seizures.    Endocrine:  Endocrine Normal Denies Diabetes.        Physical Exam  General: Well nourished, Cooperative and Alert    Airway:  Mallampati: II   Mouth Opening: Normal  TM Distance: Normal  Tongue: Normal  Neck ROM: Normal ROM    Dental:  Intact        Anesthesia Plan  Type of Anesthesia, risks & benefits discussed:    Anesthesia Type: Gen ETT, Epidural, Spinal, CSE  Intra-op Monitoring Plan: Standard ASA Monitors  Post Op Pain Control Plan: multimodal analgesia, IV/PO Opioids PRN and epidural analgesia  Informed Consent: Informed consent signed with the Patient and all " parties understand the risks and agree with anesthesia plan.  All questions answered.   ASA Score: 2  Day of Surgery Review of History & Physical: H&P Update referred to the surgeon/provider.    Ready For Surgery From Anesthesia Perspective.     .       Abbe Flap (Lower To Upper Lip) Text: The defect of the upper lip was assessed and measured.  Given the location and size of the defect, an Abbe flap was deemed most appropriate. Using a sterile surgical marker, an appropriate Abbe flap was measured and drawn on the lower lip. Local anesthesia was then infiltrated. A scalpel was then used to incise the upper lip through and through the skin, vermilion, muscle and mucosa, leaving the flap pedicled on the opposite side.  The flap was then rotated and transferred to the lower lip defect.  The flap was then sutured into place with a three layer technique, closing the orbicularis oris muscle layer with subcutaneous buried sutures, followed by a mucosal layer and an epidermal layer.

## 2023-09-24 NOTE — ANESTHESIA POSTPROCEDURE EVALUATION
Anesthesia Post Evaluation    Patient: [facility]    Procedure(s) Performed: Procedure(s) (LRB):  CERCLAGE, CERVIX (N/A)    Final Anesthesia Type: spinal      Patient location during evaluation: PACU  Patient participation: Yes- Able to Participate  Level of consciousness: awake and alert  Post-procedure vital signs: reviewed and stable  Pain management: adequate  Airway patency: patent    PONV status at discharge: No PONV  Anesthetic complications: no      Cardiovascular status: blood pressure returned to baseline and hemodynamically stable  Respiratory status: unassisted, spontaneous ventilation and room air  Hydration status: euvolemic  Follow-up not needed.          Vitals Value Taken Time   /67 09/24/23 1607   Temp 36.4 °C (97.5 °F) 09/24/23 1607   Pulse 84 09/24/23 1607   Resp 16 09/24/23 1607   SpO2 98 % 09/24/23 1607         No case tracking events are documented in the log.      Pain/Preet Score: Pain Rating Prior to Med Admin: 0 (9/24/2023  4:05 PM)

## 2023-09-24 NOTE — TRANSFER OF CARE
"Anesthesia Transfer of Care Note    Patient: Janki Hilton    Procedure(s) Performed: Procedure(s) (LRB):  CERCLAGE, CERVIX (N/A)    Patient location: Labor and Delivery    Anesthesia Type: spinal    Transport from OR: Transported from OR on room air with adequate spontaneous ventilation    Post pain: adequate analgesia    Post assessment: no apparent anesthetic complications    Post vital signs: stable    Level of consciousness: awake and alert    Nausea/Vomiting: no nausea/vomiting    Complications: none    Transfer of care protocol was followed      Last vitals:   Visit Vitals  /77   Pulse 93   Temp 36.8 °C (98.3 °F) (Oral)   Resp 18   Ht 5' 5" (1.651 m)   Wt 75.5 kg (166 lb 7.2 oz)   LMP 04/23/2023   SpO2 99%   Breastfeeding No   BMI 27.70 kg/m²     "

## 2023-09-24 NOTE — ANESTHESIA PROCEDURE NOTES
Spinal    Diagnosis: cervical insufficiency  Patient location during procedure: OR  Start time: 9/24/2023 9:02 AM  Timeout: 9/24/2023 9:02 AM  End time: 9/24/2023 9:03 AM    Staffing  Authorizing Provider: Flip Posada III, MD  Performing Provider: Rocky Avalos MD    Staffing  Performed by: Rocky Avalos MD  Authorized by: Flip Posada III, MD    Preanesthetic Checklist  Completed: patient identified, IV checked, site marked, risks and benefits discussed, surgical consent, monitors and equipment checked, pre-op evaluation and timeout performed  Spinal Block  Patient position: sitting  Prep: ChloraPrep  Patient monitoring: heart rate, cardiac monitor, continuous pulse ox and frequent blood pressure checks  Approach: midline  Location: L3-4  Injection technique: single shot  CSF Fluid: clear free-flowing CSF  Needle  Needle type: pencil-tip   Needle gauge: 25 G  Needle length: 3.5 in  Additional Documentation: incremental injection, negative aspiration for heme and no paresthesia on injection  Needle localization: anatomical landmarks  Assessment  Sensory level: T7   Dermatomal levels determined by pinch or prick  Ease of block: easy  Patient's tolerance of the procedure: comfortable throughout block and no complaints

## 2023-09-24 NOTE — OP NOTE
Date of operation: 09/24/2023    Pre operative diagnosis:  1. 22w4d  2. Acute cervical insufficiency    Post operative diagnosis:   The same as above     Procedure: Physical-exam indicated [place] rescue cerclage x 2    Surgeon: Dr. Torery Jain    Assistant: Dr. Kathie Farnsworth; Dr. Kylie Waite    Anesthesia: spinal    Intraoperative findings: Cervix was 3cm dilated and approximately 3 cm in length at the initiation of the procedure.  The membranes were bulging at the os and were slightly cloudy but not opaque.  FHR confirmed prior to procedure. Post cerclage placement the cervix was closed and 3-3.5 cm in length with no bulging of the ARAM.     EBL: <10 cc    Procedure: Patient taken to the operating room. After spinal anesthesia administered, patient was placed in dorsal lithotomy position.   Patient prepped and draped in usual fashion.  A Galeano catheter was placed in the patient's bladder and drainage was clamped.  The bladder was filled with approximately 150 cc of sterile water Ron retraction of the membranes.  The patient was placed in steep Trendelenburg.  A weighted speculum was placed in vagina. Right angle retractors were used for visualization of cervix.The anterior and posterior lips of the cervix were grasped with ring forceps.  A Galeano catheter with a 10 cc balloon was inserted through the cervix and the balloon was inflated to approximately 6 to 7 cc to further retract the membranes and provide an area for suture placement.  No bleeding was encountered with this.   #1 Ethibond suture was placed in a pursestring fashion. Suture placed at 12 o'clock position of cervix and extended to approximately 9 o'clock position of cervix, followed by suture from 9 o'clock to about 6 o'clock position, followed by 6 o'clock to 3 o'clock position and back to 12 o'clock (counterclockwise purse string closure).  This suture was then used to retract the cervix down further from the membranes and a 2nd suture was  placed approximately 5 mm above this in a similar fashion.  Following placement of the 2nd suture the upper most suture was then tied as the intracervical Galeano was deflated and removed.  Suture was tied at the 12 o'clock position the the 1st suture was also tied and secured. The weighted speculum and retractors were removed from vagina.  Cervix palpated as closed. Hemostasis noted after procedure. No complications. All counts were correct x 2.  Patient tolerated the procedure well and was taken to recovery room in stable condition. I was present, scrubbed and performed all key portions of the procedure.     The patient received 2 g of cefazolin preoperatively and will receive mm of indomethacin in the recovery room.  Due to the cloudy appearance of the membranes will use an antibiotic regimen similar to that described by Yeo & Romero we allow prolongation of the pregnancy beyond the 24 week benita.  Will plan for 24 hours of indomethacin as well.    Torrey Jain Jr., MD  Maternal Fetal Medicine

## 2023-09-24 NOTE — PROGRESS NOTES
Progress Note  Obstetrics        SUBJECTIVE:      History of Present Illness:  Janki Hilton is a 38 y.o.  at 22w3d who presents as a transfer for premature cervical dilation. Patient presented to OSH with complaints of painless vaginal bleeding. On exam, patient noted to have small amount of bleeding and SVE was 3/90/-3.     Upon arrival, patient endorses vague abdominal cramping that was occurring every 45 minutes; however, vaginal bleeding has stopped. While admitting, patient noted increased in frequency of abdominal pain and described as contractions. Patient endorses increased vaginal spotting. Denies dysuria, increased urgency/ frequency/ hesitancy. Reports regular bowel movements.      Patient has a past medical history of recurrent pregnancy loss, AMA, h/o  delivery in previous pregnancy (22w, endorses contractions followed by cervical change and subsequent delivery).      Hospital Course:   2023: HD#1. Admitted for observation and possible rescue cerclage placement.   2023: HD#2. Vaginal spotting overnight and contractions. SVE 3/90/-3. VSS. TOCO with contractions q 5 minutes which spaced overnight. NPO and IVF at [facility]. Will evaluate for  labor vs possible cerclage placement today.     Interval History:   Minimal contraction. Vaginal bleeding stable. Reports fetal flutter. Denies leakage of fluids.     OB History            5    Para   1    Term           1    AB   3    Living               SAB   3    IAB        Ectopic        Multiple        Live Births                      No family history on file.  Social History            Tobacco Use    Smoking status: Never    Smokeless tobacco: Never   Substance Use Topics    Alcohol use: Yes       Comment: occasional     Drug use: Never             Current Facility-Administered Medications   Medication    acetaminophen tablet 650 mg    diphenhydrAMINE capsule 25 mg    diphenhydrAMINE injection 25 mg    [START ON  9/24/2023] lactated ringers infusion    ondansetron disintegrating tablet 8 mg    [START ON 9/24/2023] prenatal vitamin oral tablet    prochlorperazine injection Soln 5 mg    senna-docusate 8.6-50 mg per tablet 1 tablet    simethicone chewable tablet 80 mg    sodium chloride 0.9% flush 10 mL         Review of Systems:  Constitutional: no significant weight change, fever, fatigue  Eyes:  No vision changes  Cardiovascular: No chest pain  Respiratory: No shortness of breath or cough  Gastrointestinal: No diarrhea, bloody stool, nausea/vomiting, constipation  Genitourinary: + vaginal bleeding, No blood in urine, painful urination, urgency of urination, frequency of urination  Skin/Breast: No painful breasts, nipple discharge, masses  Neurological: No headache  Endocrine: No hot flushes  Psychiatric: No depression or anxiety     OBJECTIVE:      Vital Signs  Temp:  [98.1 °F (36.7 °C)-99 °F (37.2 °C)] 98.2 °F (36.8 °C)  Pulse:  [] 95  Resp:  [16] 16  SpO2:  [94 %-100 %] 98 %  BP: (107-129)/(59-78) 118/69     Physical Exam:  Gen: A&Ox3, NAD  CV: RRR  Pulm: LCTAB  Abd: Soft, non-distended, non-tender to palpation without rebound or guarding  Ext: PPP, no peripheral edema  SVE: 3/90/-3     Laboratory  Recent Labs   Lab 09/23/23  1708   WBC 15.97*   HGB 11.6*   HCT 34.7*   MCV 89         Diagnostic Results:  COMPLETED AT OSH:      EXAMINATION:  US OB LIMITED 1 OR MORE GESTATIONS     CLINICAL HISTORY:  vaginal bleeding;     TECHNIQUE:  Transabdominal 2nd or 3rd trimester obstetrical ultrasound was performed.     COMPARISON:  Obstetrical ultrasound performed 07/06/2023.     FINDINGS:  There is a single, breech, intrauterine gestation.     Amniotic fluid volume is within normal limits with an ARISTIDES of 15.1 cm.  Fetal cardiac activity is seen with a rate of 145 beats per minute on M-mode imaging.     Cervix appears completely dilated.  Cervical length approximately 3.8 cm.  The placenta is posterior.     Impression:      Single, breech, intrauterine gestation.     Amniotic fluid volume is within normal limits and there is no evidence of placenta previa.     Additional details, as above.     ASSESSMENT/PLAN:            Active Hospital Problems     Diagnosis   POA    *Cervical insufficiency during pregnancy, antepartum [O34.30]   Yes    Recurrent pregnancy loss [N96]   Unknown    22 weeks gestation of pregnancy [Z3A.22]   Not Applicable    History of  delivery [Z87.51]   Not Applicable       Resolved Hospital Problems   No resolved problems to display.         Janki Hilton is a 38 y.o.  at 22w4d who presents for previable  labor.      1.  Labor:   - VSS   - Urine Culture: pending   - Vaginal Screen: neg trichomonas, BV, yeast   - GCCT: pending   - SVE: 3/90/-3   - TOCO: initially contractions every 4-5 minutes, irregular    - NPO and IVF LR 75 cc/hr;   - Cerclage consents and blood transfusion consents reviewed and signed.   - Patient initially transferred for possible cerclage placement in the setting of painless cervical dilation. At this time, patient is regularly rupinder with signs of  labor which would preclude her from having cerclage placed. Discussed with patient and partner that if signs of  labor continue, would not be a candidate for cerclage. Discussed that at this gestational age, patient is not a candidate for steroids or tocolytics. Patient and partner appropriately upset.   - Will evaluate today with MFM to determine if cerclage placement is possible.      2. History of  Delivery:   - Patient endorses  delivery at 22w in the setting of contractions followed by cervical dilation      3. Recurrent Pregnancy Loss:   - Per records review, work-up has not been completed.      4. AMA:   - cfDNA neg      Shreya Avendano MD/MPH  OB/GYN PGY-3  [facility]

## 2023-09-24 NOTE — H&P
H&P  Obstetrics      SUBJECTIVE:     History of Present Illness:  Janki Hilton is a 38 y.o.  at 22w3d who presents as a transfer for premature cervical dilation. Patient presented to OSH with complaints of painless vaginal bleeding. On exam, patient noted to have small amount of bleeding and SVE was 3/90/-3.    Upon arrival, patient endorses vague abdominal cramping that was occurring every 45 minutes; however, vaginal bleeding has stopped. While admitting, patient noted increased in frequency of abdominal pain and described as contractions. Patient endorses increased vaginal spotting. Denies dysuria, increased urgency/ frequency/ hesitancy. Reports regular bowel movements.     Patient has a past medical history of recurrent pregnancy loss, AMA, h/o  delivery in previous pregnancy (22w, endorses contractions followed by cervical change and subsequent delivery).     Review of patient's allergies indicates:  No Known Allergies    No past medical history on file.  No past surgical history on file.  OB History          5    Para   1    Term           1    AB   3    Living             SAB   3    IAB        Ectopic        Multiple        Live Births                   No family history on file.  Social History     Tobacco Use    Smoking status: Never    Smokeless tobacco: Never   Substance Use Topics    Alcohol use: Yes     Comment: occasional     Drug use: Never       Current Facility-Administered Medications   Medication    acetaminophen tablet 650 mg    diphenhydrAMINE capsule 25 mg    diphenhydrAMINE injection 25 mg    [START ON 2023] lactated ringers infusion    ondansetron disintegrating tablet 8 mg    [START ON 2023] prenatal vitamin oral tablet    prochlorperazine injection Soln 5 mg    senna-docusate 8.6-50 mg per tablet 1 tablet    simethicone chewable tablet 80 mg    sodium chloride 0.9% flush 10 mL       Review of Systems:  Constitutional: no significant weight change,  fever, fatigue  Eyes:  No vision changes  Cardiovascular: No chest pain  Respiratory: No shortness of breath or cough  Gastrointestinal: No diarrhea, bloody stool, nausea/vomiting, constipation  Genitourinary: + vaginal bleeding, No blood in urine, painful urination, urgency of urination, frequency of urination  Skin/Breast: No painful breasts, nipple discharge, masses  Neurological: No headache  Endocrine: No hot flushes  Psychiatric: No depression or anxiety     OBJECTIVE:     Vital Signs  Temp:  [98.1 °F (36.7 °C)-99 °F (37.2 °C)] 98.2 °F (36.8 °C)  Pulse:  [] 95  Resp:  [16] 16  SpO2:  [94 %-100 %] 98 %  BP: (107-129)/(59-78) 118/69    Physical Exam:  Gen: A&Ox3, NAD  CV: RRR  Pulm: LCTAB  Abd: Soft, non-distended, non-tender to palpation without rebound or guarding  Ext: PPP, no peripheral edema  SVE: 3/90/-3    Laboratory  Recent Results (from the past 96 hour(s))   Urinalysis, Reflex to Urine Culture Urine, Clean Catch    Collection Time: 09/23/23  9:26 AM    Specimen: Urine   Result Value Ref Range    Specimen UA Urine, Clean Catch     Color, UA Yellow Yellow, Straw, Elaina    Appearance, UA Hazy (A) Clear    pH, UA 8.0 5.0 - 8.0    Specific Gravity, UA 1.010 1.005 - 1.030    Protein, UA Negative Negative    Glucose, UA Negative Negative    Ketones, UA Negative Negative    Bilirubin (UA) Negative Negative    Occult Blood UA 3+ (A) Negative    Nitrite, UA Negative Negative    Urobilinogen, UA Negative <2.0 EU/dL    Leukocytes, UA 3+ (A) Negative   Vaginal Screen    Collection Time: 09/23/23  9:26 AM    Specimen: Vagina   Result Value Ref Range    Trichomonas None None    Clue Cells None None    Budding Yeast None None    Fungal Hyphae None None    WBC - Vaginal Screen Many (A) None    Bacteria - Vaginal Screen Moderate (A) None    Wet Prep Source Vagina    Urinalysis Microscopic    Collection Time: 09/23/23  9:26 AM   Result Value Ref Range    RBC, UA 4 0 - 4 /hpf    WBC, UA 51 (H) 0 - 5 /hpf    WBC  Clumps, UA Rare None-Rare    Bacteria Occasional None-Occ /hpf    Squam Epithel, UA 6 /hpf    Microscopic Comment SEE COMMENT    CBC auto differential    Collection Time: 09/23/23  5:08 PM   Result Value Ref Range    WBC 15.97 (H) 3.90 - 12.70 K/uL    RBC 3.92 (L) 4.00 - 5.40 M/uL    Hemoglobin 11.6 (L) 12.0 - 16.0 g/dL    Hematocrit 34.7 (L) 37.0 - 48.5 %    MCV 89 82 - 98 fL    MCH 29.6 27.0 - 31.0 pg    MCHC 33.4 32.0 - 36.0 g/dL    RDW 13.9 11.5 - 14.5 %    Platelets 302 150 - 450 K/uL    MPV 9.9 9.2 - 12.9 fL    Immature Granulocytes 2.9 (H) 0.0 - 0.5 %    Gran # (ANC) 12.7 (H) 1.8 - 7.7 K/uL    Immature Grans (Abs) 0.47 (H) 0.00 - 0.04 K/uL    Lymph # 1.9 1.0 - 4.8 K/uL    Mono # 0.8 0.3 - 1.0 K/uL    Eos # 0.0 0.0 - 0.5 K/uL    Baso # 0.05 0.00 - 0.20 K/uL    nRBC 0 0 /100 WBC    Gran % 79.8 (H) 38.0 - 73.0 %    Lymph % 12.0 (L) 18.0 - 48.0 %    Mono % 4.8 4.0 - 15.0 %    Eosinophil % 0.2 0.0 - 8.0 %    Basophil % 0.3 0.0 - 1.9 %    Differential Method Automated    Type & Screen    Collection Time: 09/23/23  5:08 PM   Result Value Ref Range    Group & Rh O POS     Indirect [place] NEG     Specimen Outdate 09/26/2023 23:59        Diagnostic Results:  COMPLETED AT OSH:     EXAMINATION:  US OB LIMITED 1 OR MORE GESTATIONS     CLINICAL HISTORY:  vaginal bleeding;     TECHNIQUE:  Transabdominal 2nd or 3rd trimester obstetrical ultrasound was performed.     COMPARISON:  Obstetrical ultrasound performed 07/06/2023.     FINDINGS:  There is a single, breech, intrauterine gestation.     Amniotic fluid volume is within normal limits with an ARISTIDES of 15.1 cm.  Fetal cardiac activity is seen with a rate of 145 beats per minute on M-mode imaging.     Cervix appears completely dilated.  Cervical length approximately 3.8 cm.  The placenta is posterior.     Impression:     Single, breech, intrauterine gestation.     Amniotic fluid volume is within normal limits and there is no evidence of placenta previa.     Additional details,  as above.    ASSESSMENT/PLAN:     Active Hospital Problems    Diagnosis  POA    *Cervical insufficiency during pregnancy, antepartum [O34.30]  Yes    Recurrent pregnancy loss [N96]  Unknown    22 weeks gestation of pregnancy [Z3A.22]  Not Applicable    History of  delivery [Z87.51]  Not Applicable      Resolved Hospital Problems   No resolved problems to display.       Janki Hilton is a 38 y.o.  at 22w3d who presents for previable  labor.     1.  Labor:   - VSS   - Urine Culture: pending   - Vaginal Screen: neg trichomonas, BV, yeast   - GCCT: pending   - SVE: 3/90/-3   - TOCO: contractions every 4-5 minutes   - NPO at [facility] and IVF LR 75 cc/hr   - Cerclage consents and blood transfusion consents reviewed and signed.   - Patient initially transferred for possible cerclage placement in the setting of painless cervical dilation. At this time, patient is regularly rupinder with signs of  labor which would preclude her from having cerclage placed. Discussed with patient and partner that if signs of  labor continue, would not be a candidate for cerclage. Discussed that at this gestational age, patient is not a candidate for steroids or tocolytics. Patient and partner appropriately upset.   - Will continue to closely monitor and escalate care if patient to progresses     2. History of  Delivery:   - Patient endorses  delivery     3. Recurrent Pregnancy Loss:   - Per records review, work-up has not been completed.     4. AMA:   - cfDNA neg     Shreya Avendano MD/MPH  OB/GYN PGY-3  [facility]

## 2023-09-25 ENCOUNTER — TELEPHONE (OUTPATIENT)
Dept: MATERNAL FETAL MEDICINE | Facility: CLINIC | Age: 38
End: 2023-09-25
Payer: OTHER GOVERNMENT

## 2023-09-25 VITALS
HEART RATE: 87 BPM | HEIGHT: 65 IN | BODY MASS INDEX: 27.73 KG/M2 | DIASTOLIC BLOOD PRESSURE: 70 MMHG | SYSTOLIC BLOOD PRESSURE: 111 MMHG | WEIGHT: 166.44 LBS | RESPIRATION RATE: 16 BRPM | OXYGEN SATURATION: 100 % | TEMPERATURE: 99 F

## 2023-09-25 DIAGNOSIS — O09.292 HISTORY OF MISCARRIAGE, CURRENTLY PREGNANT, SECOND TRIMESTER: Primary | ICD-10-CM

## 2023-09-25 DIAGNOSIS — Z87.51 HISTORY OF PRETERM DELIVERY: ICD-10-CM

## 2023-09-25 DIAGNOSIS — O34.32 CERVICAL CERCLAGE SUTURE PRESENT IN SECOND TRIMESTER: ICD-10-CM

## 2023-09-25 LAB — BACTERIA UR CULT: NORMAL

## 2023-09-25 PROCEDURE — 25000003 PHARM REV CODE 250

## 2023-09-25 PROCEDURE — 99238 HOSP IP/OBS DSCHRG MGMT 30/<: CPT | Mod: ,,, | Performed by: OBSTETRICS & GYNECOLOGY

## 2023-09-25 PROCEDURE — 99238 PR HOSPITAL DISCHARGE DAY,<30 MIN: ICD-10-PCS | Mod: ,,, | Performed by: OBSTETRICS & GYNECOLOGY

## 2023-09-25 PROCEDURE — 63700000 PHARM REV CODE 250 ALT 637 W/O HCPCS

## 2023-09-25 RX ORDER — CEPHALEXIN 500 MG/1
500 CAPSULE ORAL EVERY 8 HOURS
Qty: 27 CAPSULE | Refills: 0 | Status: SHIPPED | OUTPATIENT
Start: 2023-09-25 | End: 2023-10-04

## 2023-09-25 RX ORDER — METRONIDAZOLE 500 MG/1
500 TABLET ORAL EVERY 12 HOURS
Qty: 18 TABLET | Refills: 0 | Status: SHIPPED | OUTPATIENT
Start: 2023-09-25 | End: 2023-10-04

## 2023-09-25 RX ORDER — AZITHROMYCIN 500 MG/1
500 TABLET, FILM COATED ORAL 2 TIMES DAILY
Qty: 18 TABLET | Refills: 0 | Status: SHIPPED | OUTPATIENT
Start: 2023-09-25 | End: 2023-10-04

## 2023-09-25 RX ORDER — AZITHROMYCIN 500 MG/1
500 TABLET, FILM COATED ORAL 2 TIMES DAILY
Qty: 18 TABLET | Refills: 0 | Status: SHIPPED | OUTPATIENT
Start: 2023-09-25 | End: 2023-09-25 | Stop reason: SDUPTHER

## 2023-09-25 RX ORDER — METRONIDAZOLE 500 MG/1
500 TABLET ORAL EVERY 12 HOURS
Qty: 18 TABLET | Refills: 0 | Status: SHIPPED | OUTPATIENT
Start: 2023-09-25 | End: 2023-09-25 | Stop reason: SDUPTHER

## 2023-09-25 RX ORDER — CEPHALEXIN 500 MG/1
500 CAPSULE ORAL EVERY 8 HOURS
Qty: 27 CAPSULE | Refills: 0 | Status: SHIPPED | OUTPATIENT
Start: 2023-09-25 | End: 2023-09-25 | Stop reason: SDUPTHER

## 2023-09-25 RX ADMIN — METRONIDAZOLE 500 MG: 500 TABLET ORAL at 10:09

## 2023-09-25 RX ADMIN — PRENATAL VIT W/ FE FUMARATE-FA TAB 27-0.8 MG 1 TABLET: 27-0.8 TAB at 09:09

## 2023-09-25 RX ADMIN — AZITHROMYCIN MONOHYDRATE 500 MG: 250 TABLET ORAL at 09:09

## 2023-09-25 RX ADMIN — INDOMETHACIN 25 MG: 25 CAPSULE ORAL at 04:09

## 2023-09-25 RX ADMIN — CEPHALEXIN 500 MG: 500 CAPSULE ORAL at 05:09

## 2023-09-25 RX ADMIN — INDOMETHACIN 25 MG: 25 CAPSULE ORAL at 10:09

## 2023-09-25 NOTE — DISCHARGE SUMMARY
Discharge Summary  Gynecology      Admit Date: 2023    Discharge Date and Time: 2023     Attending Physician: Torrey Jain MD    Principal Diagnoses: Cervical cerclage suture present in second trimester    Active Hospital Problems    Diagnosis  POA    *Cervical cerclage suture present in second trimester [O34.32]  Unknown     [place] Cerclage (2 knots, both at 12 o'clock)       Cervical insufficiency during pregnancy, antepartum [O34.30]  Yes    Recurrent pregnancy loss [N96]  Yes    22 weeks gestation of pregnancy [Z3A.22]  Not Applicable    History of  delivery [Z87.51]  Not Applicable      Resolved Hospital Problems   No resolved problems to display.       Procedures: Procedure(s) (LRB):  CERCLAGE, CERVIX (N/A)    Discharged Condition: good    Hospital Course: Patient presented for premature cervical dilation and had cerclage placed without difficulty. Please see OP note for further details. Tolerated procedure well and patient was taken to recovery in a stable condition. Prior to discharge patient was able to void, ambulate, tolerate PO and pain was well controlled with PO meds. Patient was given routine post-op instructions for which patient voiced understanding. Patient was subsequently discharged home. In the setting of visible membranes with amniotic fluid sludge patient started on azithromycin, flagyl, and keflex for 10 days.     Patient moving to [place], [place] this week. List of [facility] providers given to follow-up with.     Consults: None    Significant Diagnostic Studies:  Recent Labs   Lab 23  1708   WBC 15.97*   HGB 11.6*   HCT 34.7*   MCV 89           Treatments:   1. Surgery as above    Disposition: Home or Self Care    Patient Instructions:   Current Discharge Medication List        START taking these medications    Details   azithromycin (ZITHROMAX) 500 MG tablet Take 1 tablet (500 mg total) by mouth 2 (two) times a day. for 9 days  Qty: 18 tablet, Refills: 0       cephALEXin (KEFLEX) 500 MG capsule Take 1 capsule (500 mg total) by mouth every 8 (eight) hours. for 9 days  Qty: 27 capsule, Refills: 0      metroNIDAZOLE (FLAGYL) 500 MG tablet Take 1 tablet (500 mg total) by mouth every 12 (twelve) hours. for 9 days  Qty: 18 tablet, Refills: 0           CONTINUE these medications which have NOT CHANGED    Details   aspirin (ECOTRIN) 81 MG EC tablet Take 1 tablet (81 mg total) by mouth once daily.  Qty: 30 tablet, Refills: 11    Associated Diagnoses: Multigravida of advanced maternal age in first trimester; History of multiple miscarriages      PNV,calcium 72-iron-folic acid (PRENATAL VITAMIN PLUS LOW IRON) 27 mg iron- 1 mg Tab Take one tablet daily.  Prescribe prenatal covered by insurance  Qty: 90 tablet, Refills: 11    Associated Diagnoses: Initial obstetric visit in first trimester      prenatal vit-iron fum-folic ac 65 mg iron- 1 mg Tab Take by mouth.           STOP taking these medications       acetaminophen (TYLENOL) 500 MG tablet Comments:   Reason for Stopping:         ketoconazole (NIZORAL) 2 % shampoo Comments:   Reason for Stopping:         metFORMIN (GLUCOPHAGE-XR) 750 MG ER 24hr tablet Comments:   Reason for Stopping:         metFORMIN (GLUCOPHAGE-XR) 750 MG ER 24hr tablet Comments:   Reason for Stopping:         progesterone (PROMETRIUM) 200 MG capsule Comments:   Reason for Stopping:         progesterone (PROMETRIUM) 200 MG capsule Comments:   Reason for Stopping:               Discharge Procedure Orders   Diet Adult Regular     Pelvic Rest   Order Comments: Pelvic rest until follow up visit. Nothing in vagina -no sex, tampons, douching, etc.     No dressing needed     Notify your health care provider if you experience any of the following:  temperature >100.4     Notify your health care provider if you experience any of the following:  persistent nausea and vomiting or diarrhea     Notify your health care provider if you experience any of the following:  severe  uncontrolled pain     Notify your health care provider if you experience any of the following:  difficulty breathing or increased cough     Notify your health care provider if you experience any of the following:  severe persistent headache     Notify your health care provider if you experience any of the following:  worsening rash     Notify your health care provider if you experience any of the following:  persistent dizziness, light-headedness, or visual disturbances     Notify your health care provider if you experience any of the following:  increased confusion or weakness     Notify your health care provider if you experience any of the following:   Order Comments: Vaginal bleeding greater than 1 teaspoon  Leakage of fluids   Contractions every 3-5 minutes for 1 hour     Activity as tolerated     Shreya Avendano MD/MPH  OB/GYN PGY-3  [facility]

## 2023-09-25 NOTE — PROGRESS NOTES
Progress Note  Obstetrics        SUBJECTIVE:      History of Present Illness:  Janki Hilton is a 38 y.o.  at 22w3d who presents as a transfer for premature cervical dilation. Patient presented to OSH with complaints of painless vaginal bleeding. On exam, patient noted to have small amount of bleeding and SVE was 3/90/-3.     Upon arrival, patient endorses vague abdominal cramping that was occurring every 45 minutes; however, vaginal bleeding has stopped. While admitting, patient noted increased in frequency of abdominal pain and described as contractions. Patient endorses increased vaginal spotting. Denies dysuria, increased urgency/ frequency/ hesitancy. Reports regular bowel movements.      Patient has a past medical history of recurrent pregnancy loss, AMA, h/o  delivery in previous pregnancy (22w, endorses contractions followed by cervical change and subsequent delivery).      Hospital Course:   2023: HD#1. Admitted for observation and possible rescue cerclage placement.   2023: HD#2. Vaginal spotting overnight and contractions. SVE 3/90/-3. VSS. TOCO with contractions q 5 minutes which spaced overnight. NPO and IVF at [facility]. Will evaluate for  labor vs possible cerclage placement today.   2023: HD#3/ POD#1 s/p Rescue Cerclage. NAEO. VSS. TOCO quiet overnight. Will discharge today.     Interval History:   No contractions. Vaginal bleeding stable. Reports fetal flutter. Denies leakage of fluids.     OB History            5    Para   1    Term           1    AB   3    Living               SAB   3    IAB        Ectopic        Multiple        Live Births                      No family history on file.  Social History            Tobacco Use    Smoking status: Never    Smokeless tobacco: Never   Substance Use Topics    Alcohol use: Yes       Comment: occasional     Drug use: Never             Current Facility-Administered Medications   Medication    acetaminophen  tablet 650 mg    diphenhydrAMINE capsule 25 mg    diphenhydrAMINE injection 25 mg    [START ON 9/24/2023] lactated ringers infusion    ondansetron disintegrating tablet 8 mg    [START ON 9/24/2023] prenatal vitamin oral tablet    prochlorperazine injection Soln 5 mg    senna-docusate 8.6-50 mg per tablet 1 tablet    simethicone chewable tablet 80 mg    sodium chloride 0.9% flush 10 mL         Review of Systems:  Constitutional: no significant weight change, fever, fatigue  Eyes:  No vision changes  Cardiovascular: No chest pain  Respiratory: No shortness of breath or cough  Gastrointestinal: No diarrhea, bloody stool, nausea/vomiting, constipation  Genitourinary: + vaginal bleeding, No blood in urine, painful urination, urgency of urination, frequency of urination  Skin/Breast: No painful breasts, nipple discharge, masses  Neurological: No headache  Endocrine: No hot flushes  Psychiatric: No depression or anxiety     OBJECTIVE:      Vital Signs  Temp:  [98.1 °F (36.7 °C)-99 °F (37.2 °C)] 98.2 °F (36.8 °C)  Pulse:  [] 95  Resp:  [16] 16  SpO2:  [94 %-100 %] 98 %  BP: (107-129)/(59-78) 118/69     Physical Exam:  Gen: A&Ox3, NAD  CV: RRR  Pulm: LCTAB  Abd: Soft, non-distended, non-tender to palpation without rebound or guarding  Ext: PPP, no peripheral edema     Laboratory  Recent Labs   Lab 09/23/23  1708   WBC 15.97*   HGB 11.6*   HCT 34.7*   MCV 89           Diagnostic Results:  COMPLETED AT OSH:      EXAMINATION:  US OB LIMITED 1 OR MORE GESTATIONS     CLINICAL HISTORY:  vaginal bleeding;     TECHNIQUE:  Transabdominal 2nd or 3rd trimester obstetrical ultrasound was performed.     COMPARISON:  Obstetrical ultrasound performed 07/06/2023.     FINDINGS:  There is a single, breech, intrauterine gestation.     Amniotic fluid volume is within normal limits with an ARISTIDES of 15.1 cm.  Fetal cardiac activity is seen with a rate of 145 beats per minute on M-mode imaging.     Cervix appears completely dilated.   Cervical length approximately 3.8 cm.  The placenta is posterior.     Impression:     Single, breech, intrauterine gestation.     Amniotic fluid volume is within normal limits and there is no evidence of placenta previa.     Additional details, as above.     ASSESSMENT/PLAN:            Active Hospital Problems     Diagnosis   POA    *Cervical insufficiency during pregnancy, antepartum [O34.30]   Yes    Recurrent pregnancy loss [N96]   Unknown    22 weeks gestation of pregnancy [Z3A.22]   Not Applicable    History of  delivery [Z87.51]   Not Applicable       Resolved Hospital Problems   No resolved problems to display.         Janki Hilton is a 38 y.o.  at 22w4d who is POD#1 s/p Rescue Cerclage.       1. POD#1 s/p Rescue Cerclage  - VSS   - Asymptomatic   - Urine Culture: no growth    - Vaginal Screen: neg trichomonas, BV, yeast   - GCCT: negative   - TOCO: quiet   - Will be discharged with antibiotics due to cloudy appearance of membranes at time of cerclage placement (azithromycin 500 mg BID x10 days, flagyl 500 mg BID x 10 days, cephalexin 500 mg TID x 10 days)   - Will help coordinate follow-up case as patient is moving to [place], [place] later this week.     2. History of  Delivery:   - Patient endorses  delivery at 22w in the setting of contractions followed by cervical dilation      3. Recurrent Pregnancy Loss:   - Per records review, work-up has not been completed.      4. AMA:   - cfDNA neg      Shreya Avendano MD/MPH  OB/GYN PGY-3  [facility]

## 2023-09-25 NOTE — PLAN OF CARE
Problem: Adult Inpatient Plan of Care  Goal: Plan of Care Review  Outcome: Ongoing, Progressing  Goal: Patient-Specific Goal (Individualized)  Outcome: Ongoing, Progressing  Goal: Absence of Hospital-Acquired Illness or Injury  Outcome: Ongoing, Progressing  Goal: Optimal Comfort and Wellbeing  Outcome: Ongoing, Progressing  Goal: Readiness for Transition of Care  Outcome: Ongoing, Progressing     Problem:  Fall Injury Risk  Goal: Absence of Fall, Infant Drop and Related Injury  Outcome: Ongoing, Progressing     Problem: Maternal-Fetal Wellbeing  Goal: Optimal Maternal-Fetal Wellbeing  Outcome: Ongoing, Progressing     Problem:  Labor  Goal: Delayed  Delivery  Outcome: Ongoing, Progressing     Problem: Bleeding (Cervical Cerclage)  Goal: Absence of Bleeding  Outcome: Ongoing, Progressing     Problem: Bowel Motility Impaired (Cervical Cerclage)  Goal: Effective Bowel Elimination  Outcome: Ongoing, Progressing     Problem: Change in Maternal-Fetal Status (Cervical Cerclage)  Goal: Optimal Maternal and Fetal Wellbeing  Outcome: Ongoing, Progressing     Problem: Fluid and Electrolyte Imbalance (Cervical Cerclage)  Goal: Fluid and Electrolyte Balance  Outcome: Ongoing, Progressing     Problem: Infection (Cervical Cerclage)  Goal: Absence of Infection Signs and Symptoms  Outcome: Ongoing, Progressing     Problem: Ongoing Anesthesia Effects (Cervical Cerclage)  Goal: Anesthesia/Sedation Recovery  Outcome: Ongoing, Progressing     Problem: Pain (Cervical Cerclage)  Goal: Acceptable Pain Control  Outcome: Ongoing, Progressing     Problem: Postoperative Nausea and Vomiting (Cervical Cerclage)  Goal: Nausea and Vomiting Relief  Outcome: Ongoing, Progressing     Problem: Postoperative Urinary Retention (Cervical Cerclage)  Goal: Effective Urinary Elimination  Outcome: Ongoing, Progressing

## 2023-09-25 NOTE — NURSING
Discharge instructions reviewed with patient. Patient verbalized understanding. All questions answered at this time. Patient discharged home to self care.

## 2023-09-25 NOTE — TELEPHONE ENCOUNTER
Faxed over referral and notes to the phone number listed before for patient to be set up in [facility].       ----- Message from Nancy Randolph RN sent at 9/25/2023 12:37 PM CDT -----    ----- Message -----  From: Shreya Avendano MD  Sent: 9/25/2023  12:08 PM CDT  To: [facility] Maternal Fetal Medicine Staff    Hi-     This patient has been admitted to antepartum and is postoperative from cerclage. She is moving this week to [place], [place] and Dr. Jain recommended following with a provider there. They need a referral (Fax Number: [phone]) for her to get scheduled. Would someone be able to assist in this endeavor?     Thank you!     Shreya Avendano MD

## 2023-09-26 ENCOUNTER — PATIENT MESSAGE (OUTPATIENT)
Dept: MATERNAL FETAL MEDICINE | Facility: CLINIC | Age: 38
End: 2023-09-26

## 2023-09-26 ENCOUNTER — NURSE TRIAGE (OUTPATIENT)
Dept: ADMINISTRATIVE | Facility: CLINIC | Age: 38
End: 2023-09-26
Payer: OTHER GOVERNMENT

## 2023-09-26 ENCOUNTER — PATIENT MESSAGE (OUTPATIENT)
Dept: OBSTETRICS AND GYNECOLOGY | Facility: CLINIC | Age: 38
End: 2023-09-26
Payer: OTHER GOVERNMENT

## 2023-09-26 ENCOUNTER — TELEPHONE (OUTPATIENT)
Dept: MATERNAL FETAL MEDICINE | Facility: CLINIC | Age: 38
End: 2023-09-26
Payer: OTHER GOVERNMENT

## 2023-09-26 ENCOUNTER — HOSPITAL ENCOUNTER (INPATIENT)
Facility: OTHER | Age: 38
LOS: 2 days | Discharge: HOME | DRG: 831 | End: 2023-09-28
Attending: OBSTETRICS & GYNECOLOGY | Admitting: OBSTETRICS & GYNECOLOGY
Payer: OTHER GOVERNMENT

## 2023-09-26 ENCOUNTER — ANESTHESIA (OUTPATIENT)
Dept: OBSTETRICS AND GYNECOLOGY | Facility: OTHER | Age: 38
DRG: 831 | End: 2023-09-26
Payer: OTHER GOVERNMENT

## 2023-09-26 DIAGNOSIS — O34.32 CERVICAL CERCLAGE SUTURE PRESENT IN SECOND TRIMESTER: ICD-10-CM

## 2023-09-26 DIAGNOSIS — Z3A.22 22 WEEKS GESTATION OF PREGNANCY: ICD-10-CM

## 2023-09-26 DIAGNOSIS — O26.892 ABDOMINAL PAIN DURING PREGNANCY IN SECOND TRIMESTER: ICD-10-CM

## 2023-09-26 DIAGNOSIS — R10.9 ABDOMINAL PAIN DURING PREGNANCY IN SECOND TRIMESTER: ICD-10-CM

## 2023-09-26 DIAGNOSIS — O34.32 CERVICAL INSUFFICIENCY DURING PREGNANCY IN SECOND TRIMESTER, ANTEPARTUM: Primary | ICD-10-CM

## 2023-09-26 PROBLEM — O47.02 THREATENED PREMATURE LABOR IN SECOND TRIMESTER: Status: ACTIVE | Noted: 2023-09-26

## 2023-09-26 LAB
ABO + RH BLD: NORMAL
ALBUMIN SERPL BCP-MCNC: 2.8 G/DL (ref 3.5–5.2)
ALP SERPL-CCNC: 165 U/L (ref 55–135)
ALT SERPL W/O P-5'-P-CCNC: 41 U/L (ref 10–44)
ANION GAP SERPL CALC-SCNC: 13 MMOL/L (ref 8–16)
AST SERPL-CCNC: 25 U/L (ref 10–40)
BASOPHILS # BLD AUTO: 0.04 K/UL (ref 0–0.2)
BASOPHILS NFR BLD: 0.3 % (ref 0–1.9)
BILIRUB SERPL-MCNC: 0.2 MG/DL (ref 0.1–1)
BILIRUB SERPL-MCNC: NORMAL MG/DL
BLD GP AB SCN CELLS X3 SERPL QL: NORMAL
BLOOD URINE, POC: NORMAL
BUN SERPL-MCNC: 5 MG/DL (ref 6–20)
CALCIUM SERPL-MCNC: 9.3 MG/DL (ref 8.7–10.5)
CHLORIDE SERPL-SCNC: 105 MMOL/L (ref 95–110)
CO2 SERPL-SCNC: 18 MMOL/L (ref 23–29)
COLOR, POC UA: NORMAL
CREAT SERPL-MCNC: 0.7 MG/DL (ref 0.5–1.4)
CREAT UR-MCNC: 83.5 MG/DL (ref 15–325)
DIFFERENTIAL METHOD: ABNORMAL
EOSINOPHIL # BLD AUTO: 0 K/UL (ref 0–0.5)
EOSINOPHIL NFR BLD: 0.1 % (ref 0–8)
ERYTHROCYTE [DISTWIDTH] IN BLOOD BY AUTOMATED COUNT: 13.6 % (ref 11.5–14.5)
EST. GFR  (NO RACE VARIABLE): >60 ML/MIN/1.73 M^2
GLUCOSE SERPL-MCNC: 214 MG/DL (ref 70–110)
GLUCOSE UR QL STRIP: NORMAL
HCT VFR BLD AUTO: 36.8 % (ref 37–48.5)
HGB BLD-MCNC: 12.3 G/DL (ref 12–16)
IMM GRANULOCYTES # BLD AUTO: 0.29 K/UL (ref 0–0.04)
IMM GRANULOCYTES NFR BLD AUTO: 1.9 % (ref 0–0.5)
KETONES UR QL STRIP: NORMAL
LEUKOCYTE ESTERASE URINE, POC: NORMAL
LYMPHOCYTES # BLD AUTO: 1.5 K/UL (ref 1–4.8)
LYMPHOCYTES NFR BLD: 10.2 % (ref 18–48)
MCH RBC QN AUTO: 29.4 PG (ref 27–31)
MCHC RBC AUTO-ENTMCNC: 33.4 G/DL (ref 32–36)
MCV RBC AUTO: 88 FL (ref 82–98)
MONOCYTES # BLD AUTO: 0.8 K/UL (ref 0.3–1)
MONOCYTES NFR BLD: 5.3 % (ref 4–15)
NEUTROPHILS # BLD AUTO: 12.4 K/UL (ref 1.8–7.7)
NEUTROPHILS NFR BLD: 82.2 % (ref 38–73)
NITRITE, POC UA: NORMAL
NRBC BLD-RTO: 0 /100 WBC
PH, POC UA: 6
PLATELET # BLD AUTO: 301 K/UL (ref 150–450)
PMV BLD AUTO: 9.6 FL (ref 9.2–12.9)
POTASSIUM SERPL-SCNC: 3.2 MMOL/L (ref 3.5–5.1)
PROT SERPL-MCNC: 6.8 G/DL (ref 6–8.4)
PROT UR-MCNC: 17 MG/DL (ref 0–15)
PROT/CREAT UR: 0.2 MG/G{CREAT} (ref 0–0.2)
PROTEIN, POC: NORMAL
RBC # BLD AUTO: 4.19 M/UL (ref 4–5.4)
SODIUM SERPL-SCNC: 136 MMOL/L (ref 136–145)
SPECIFIC GRAVITY, POC UA: 1.02
SPECIMEN OUTDATE: NORMAL
UROBILINOGEN, POC UA: NORMAL
WBC # BLD AUTO: 15.04 K/UL (ref 3.9–12.7)

## 2023-09-26 PROCEDURE — 84156 ASSAY OF PROTEIN URINE: CPT

## 2023-09-26 PROCEDURE — 80053 COMPREHEN METABOLIC PANEL: CPT | Performed by: STUDENT IN AN ORGANIZED HEALTH CARE EDUCATION/TRAINING PROGRAM

## 2023-09-26 PROCEDURE — 25000003 PHARM REV CODE 250: Performed by: STUDENT IN AN ORGANIZED HEALTH CARE EDUCATION/TRAINING PROGRAM

## 2023-09-26 PROCEDURE — 99285 EMERGENCY DEPT VISIT HI MDM: CPT | Mod: 25

## 2023-09-26 PROCEDURE — 99223 PR INITIAL HOSPITAL CARE,LEVL III: ICD-10-PCS | Mod: ,,, | Performed by: OBSTETRICS & GYNECOLOGY

## 2023-09-26 PROCEDURE — 96372 THER/PROPH/DIAG INJ SC/IM: CPT

## 2023-09-26 PROCEDURE — 81002 URINALYSIS NONAUTO W/O SCOPE: CPT

## 2023-09-26 PROCEDURE — 25000003 PHARM REV CODE 250

## 2023-09-26 PROCEDURE — 99223 1ST HOSP IP/OBS HIGH 75: CPT | Mod: ,,, | Performed by: OBSTETRICS & GYNECOLOGY

## 2023-09-26 PROCEDURE — 96360 HYDRATION IV INFUSION INIT: CPT

## 2023-09-26 PROCEDURE — 63700000 PHARM REV CODE 250 ALT 637 W/O HCPCS

## 2023-09-26 PROCEDURE — 63600175 PHARM REV CODE 636 W HCPCS

## 2023-09-26 PROCEDURE — 81000 URINALYSIS NONAUTO W/SCOPE: CPT

## 2023-09-26 PROCEDURE — 86850 RBC ANTIBODY SCREEN: CPT

## 2023-09-26 PROCEDURE — 11000001 HC ACUTE MED/SURG PRIVATE ROOM

## 2023-09-26 PROCEDURE — 87086 URINE CULTURE/COLONY COUNT: CPT

## 2023-09-26 PROCEDURE — 63600175 PHARM REV CODE 636 W HCPCS: Performed by: STUDENT IN AN ORGANIZED HEALTH CARE EDUCATION/TRAINING PROGRAM

## 2023-09-26 PROCEDURE — 85025 COMPLETE CBC W/AUTO DIFF WBC: CPT

## 2023-09-26 RX ORDER — INDOMETHACIN 25 MG/1
50 CAPSULE ORAL ONCE
Status: COMPLETED | OUTPATIENT
Start: 2023-09-26 | End: 2023-09-26

## 2023-09-26 RX ORDER — NIFEDIPINE 10 MG/1
10 CAPSULE ORAL EVERY 4 HOURS
Qty: 180 CAPSULE | Refills: 11 | Status: ON HOLD | OUTPATIENT
Start: 2023-09-26 | End: 2023-09-28 | Stop reason: HOSPADM

## 2023-09-26 RX ORDER — PRENATAL WITH FERROUS FUM AND FOLIC ACID 3080; 920; 120; 400; 22; 1.84; 3; 20; 10; 1; 12; 200; 27; 25; 2 [IU]/1; [IU]/1; MG/1; [IU]/1; MG/1; MG/1; MG/1; MG/1; MG/1; MG/1; UG/1; MG/1; MG/1; MG/1; MG/1
1 TABLET ORAL DAILY
Status: DISCONTINUED | OUTPATIENT
Start: 2023-09-27 | End: 2023-09-26

## 2023-09-26 RX ORDER — ACETAMINOPHEN 325 MG/1
650 TABLET ORAL EVERY 6 HOURS PRN
Status: DISCONTINUED | OUTPATIENT
Start: 2023-09-26 | End: 2023-09-28 | Stop reason: HOSPADM

## 2023-09-26 RX ORDER — ASPIRIN 81 MG/1
81 TABLET ORAL DAILY
Status: DISCONTINUED | OUTPATIENT
Start: 2023-09-27 | End: 2023-09-28 | Stop reason: HOSPADM

## 2023-09-26 RX ORDER — SIMETHICONE 80 MG
1 TABLET,CHEWABLE ORAL EVERY 6 HOURS PRN
Status: DISCONTINUED | OUTPATIENT
Start: 2023-09-26 | End: 2023-09-26

## 2023-09-26 RX ORDER — CALCIUM GLUCONATE 98 MG/ML
1 INJECTION, SOLUTION INTRAVENOUS
Status: DISCONTINUED | OUTPATIENT
Start: 2023-09-26 | End: 2023-09-27

## 2023-09-26 RX ORDER — ONDANSETRON 8 MG/1
8 TABLET, ORALLY DISINTEGRATING ORAL EVERY 8 HOURS PRN
Status: DISCONTINUED | OUTPATIENT
Start: 2023-09-26 | End: 2023-09-28 | Stop reason: HOSPADM

## 2023-09-26 RX ORDER — ONDANSETRON 8 MG/1
8 TABLET, ORALLY DISINTEGRATING ORAL EVERY 8 HOURS PRN
Status: DISCONTINUED | OUTPATIENT
Start: 2023-09-26 | End: 2023-09-26

## 2023-09-26 RX ORDER — METRONIDAZOLE 500 MG/1
500 TABLET ORAL EVERY 12 HOURS
Status: DISCONTINUED | OUTPATIENT
Start: 2023-09-26 | End: 2023-09-26

## 2023-09-26 RX ORDER — SODIUM CHLORIDE 0.9 % (FLUSH) 0.9 %
10 SYRINGE (ML) INJECTION
Status: DISCONTINUED | OUTPATIENT
Start: 2023-09-26 | End: 2023-09-26

## 2023-09-26 RX ORDER — BETAMETHASONE SODIUM PHOSPHATE AND BETAMETHASONE ACETATE 3; 3 MG/ML; MG/ML
12 INJECTION, SUSPENSION INTRA-ARTICULAR; INTRALESIONAL; INTRAMUSCULAR; SOFT TISSUE EVERY 24 HOURS
Status: DISCONTINUED | OUTPATIENT
Start: 2023-09-27 | End: 2023-09-26

## 2023-09-26 RX ORDER — SIMETHICONE 80 MG
1 TABLET,CHEWABLE ORAL EVERY 6 HOURS PRN
Status: DISCONTINUED | OUTPATIENT
Start: 2023-09-26 | End: 2023-09-28 | Stop reason: HOSPADM

## 2023-09-26 RX ORDER — METRONIDAZOLE 500 MG/1
500 TABLET ORAL
Status: DISCONTINUED | OUTPATIENT
Start: 2023-09-26 | End: 2023-09-28 | Stop reason: HOSPADM

## 2023-09-26 RX ORDER — AZITHROMYCIN 250 MG/1
500 TABLET, FILM COATED ORAL 2 TIMES DAILY
Status: DISCONTINUED | OUTPATIENT
Start: 2023-09-26 | End: 2023-09-28 | Stop reason: HOSPADM

## 2023-09-26 RX ORDER — PROCHLORPERAZINE EDISYLATE 5 MG/ML
5 INJECTION INTRAMUSCULAR; INTRAVENOUS EVERY 6 HOURS PRN
Status: DISCONTINUED | OUTPATIENT
Start: 2023-09-26 | End: 2023-09-28 | Stop reason: HOSPADM

## 2023-09-26 RX ORDER — AZITHROMYCIN 250 MG/1
500 TABLET, FILM COATED ORAL DAILY
Status: DISCONTINUED | OUTPATIENT
Start: 2023-09-27 | End: 2023-09-26

## 2023-09-26 RX ORDER — AMOXICILLIN 250 MG
1 CAPSULE ORAL NIGHTLY PRN
Status: DISCONTINUED | OUTPATIENT
Start: 2023-09-26 | End: 2023-09-26

## 2023-09-26 RX ORDER — CEPHALEXIN 500 MG/1
500 CAPSULE ORAL EVERY 8 HOURS
Status: DISCONTINUED | OUTPATIENT
Start: 2023-09-26 | End: 2023-09-28 | Stop reason: HOSPADM

## 2023-09-26 RX ORDER — ACETAMINOPHEN 325 MG/1
650 TABLET ORAL EVERY 6 HOURS PRN
Status: DISCONTINUED | OUTPATIENT
Start: 2023-09-26 | End: 2023-09-26

## 2023-09-26 RX ORDER — CEPHALEXIN 500 MG/1
500 CAPSULE ORAL EVERY 8 HOURS
Status: DISCONTINUED | OUTPATIENT
Start: 2023-09-26 | End: 2023-09-26

## 2023-09-26 RX ORDER — BETAMETHASONE SODIUM PHOSPHATE AND BETAMETHASONE ACETATE 3; 3 MG/ML; MG/ML
12 INJECTION, SUSPENSION INTRA-ARTICULAR; INTRALESIONAL; INTRAMUSCULAR; SOFT TISSUE
Status: COMPLETED | OUTPATIENT
Start: 2023-09-26 | End: 2023-09-27

## 2023-09-26 RX ORDER — PRENATAL WITH FERROUS FUM AND FOLIC ACID 3080; 920; 120; 400; 22; 1.84; 3; 20; 10; 1; 12; 200; 27; 25; 2 [IU]/1; [IU]/1; MG/1; [IU]/1; MG/1; MG/1; MG/1; MG/1; MG/1; MG/1; UG/1; MG/1; MG/1; MG/1; MG/1
1 TABLET ORAL DAILY
Status: DISCONTINUED | OUTPATIENT
Start: 2023-09-26 | End: 2023-09-28 | Stop reason: HOSPADM

## 2023-09-26 RX ORDER — SODIUM CHLORIDE, SODIUM LACTATE, POTASSIUM CHLORIDE, CALCIUM CHLORIDE 600; 310; 30; 20 MG/100ML; MG/100ML; MG/100ML; MG/100ML
INJECTION, SOLUTION INTRAVENOUS CONTINUOUS
Status: DISCONTINUED | OUTPATIENT
Start: 2023-09-27 | End: 2023-09-27

## 2023-09-26 RX ORDER — MAGNESIUM SULFATE HEPTAHYDRATE 40 MG/ML
6 INJECTION, SOLUTION INTRAVENOUS ONCE
Status: COMPLETED | OUTPATIENT
Start: 2023-09-27 | End: 2023-09-27

## 2023-09-26 RX ORDER — DIPHENHYDRAMINE HCL 25 MG
25 CAPSULE ORAL EVERY 4 HOURS PRN
Status: DISCONTINUED | OUTPATIENT
Start: 2023-09-26 | End: 2023-09-26

## 2023-09-26 RX ORDER — SODIUM CHLORIDE 0.9 % (FLUSH) 0.9 %
10 SYRINGE (ML) INJECTION
Status: DISCONTINUED | OUTPATIENT
Start: 2023-09-26 | End: 2023-09-28 | Stop reason: HOSPADM

## 2023-09-26 RX ORDER — MAGNESIUM SULFATE HEPTAHYDRATE 40 MG/ML
2 INJECTION, SOLUTION INTRAVENOUS CONTINUOUS
Status: DISCONTINUED | OUTPATIENT
Start: 2023-09-27 | End: 2023-09-27

## 2023-09-26 RX ORDER — DIPHENHYDRAMINE HYDROCHLORIDE 50 MG/ML
25 INJECTION INTRAMUSCULAR; INTRAVENOUS EVERY 4 HOURS PRN
Status: DISCONTINUED | OUTPATIENT
Start: 2023-09-26 | End: 2023-09-26

## 2023-09-26 RX ORDER — SODIUM CHLORIDE AND POTASSIUM CHLORIDE 150; 450 MG/100ML; MG/100ML
INJECTION, SOLUTION INTRAVENOUS CONTINUOUS
Status: DISCONTINUED | OUTPATIENT
Start: 2023-09-26 | End: 2023-09-27

## 2023-09-26 RX ORDER — PROCHLORPERAZINE EDISYLATE 5 MG/ML
5 INJECTION INTRAMUSCULAR; INTRAVENOUS EVERY 6 HOURS PRN
Status: DISCONTINUED | OUTPATIENT
Start: 2023-09-26 | End: 2023-09-26

## 2023-09-26 RX ORDER — DIPHENHYDRAMINE HYDROCHLORIDE 50 MG/ML
25 INJECTION INTRAMUSCULAR; INTRAVENOUS EVERY 4 HOURS PRN
Status: DISCONTINUED | OUTPATIENT
Start: 2023-09-26 | End: 2023-09-28 | Stop reason: HOSPADM

## 2023-09-26 RX ORDER — DIPHENHYDRAMINE HCL 25 MG
25 CAPSULE ORAL EVERY 4 HOURS PRN
Status: DISCONTINUED | OUTPATIENT
Start: 2023-09-26 | End: 2023-09-28 | Stop reason: HOSPADM

## 2023-09-26 RX ORDER — AMOXICILLIN 250 MG
1 CAPSULE ORAL NIGHTLY PRN
Status: DISCONTINUED | OUTPATIENT
Start: 2023-09-26 | End: 2023-09-28 | Stop reason: HOSPADM

## 2023-09-26 RX ADMIN — SODIUM CHLORIDE, SODIUM LACTATE, POTASSIUM CHLORIDE, CALCIUM CHLORIDE AND DEXTROSE MONOHYDRATE 1000 ML: 5; 600; 310; 30; 20 INJECTION, SOLUTION INTRAVENOUS at 06:09

## 2023-09-26 RX ADMIN — AZITHROMYCIN MONOHYDRATE 500 MG: 250 TABLET ORAL at 08:09

## 2023-09-26 RX ADMIN — POTASSIUM CHLORIDE AND SODIUM CHLORIDE: 450; 150 INJECTION, SOLUTION INTRAVENOUS at 09:09

## 2023-09-26 RX ADMIN — CEPHALEXIN 500 MG: 500 CAPSULE ORAL at 09:09

## 2023-09-26 RX ADMIN — INDOMETHACIN 50 MG: 25 CAPSULE ORAL at 08:09

## 2023-09-26 RX ADMIN — PRENATAL VIT W/ FE FUMARATE-FA TAB 27-0.8 MG 1 TABLET: 27-0.8 TAB at 09:09

## 2023-09-26 RX ADMIN — BETAMETHASONE SODIUM PHOSPHATE AND BETAMETHASONE ACETATE 12 MG: 3; 3 INJECTION, SUSPENSION INTRA-ARTICULAR; INTRALESIONAL; INTRAMUSCULAR at 07:09

## 2023-09-26 RX ADMIN — METRONIDAZOLE 500 MG: 500 TABLET ORAL at 09:09

## 2023-09-26 NOTE — ED PROVIDER NOTES
Encounter Date: 2023       History     Chief Complaint   Patient presents with    Contractions     Ms. Hilton is a 39yo  at 22w6d with an IUP complicated by low lying placenta and cervical insufficiency s/p Campa cerclage placement on 23.  She reports cyclic abdominopelvic pain that began this morning and which was accompanied by moderate spotting.  She says she has gone through two panty liners today both with bright blood and w/o clots.  She describes her abdominopelvic pain and coming and going every 6-8 min and lasting 3 seconds or so.  She denies LOF.  She has no other concerns at this time.      Review of patient's allergies indicates:  No Known Allergies  No past medical history on file.  Past Surgical History:   Procedure Laterality Date    CERVICAL CERCLAGE N/A 2023    Procedure: CERCLAGE, CERVIX;  Surgeon: Torrey Jain MD;  Location: [facility];  Service: OB/GYN;  Laterality: N/A;     No family history on file.  Social History     Tobacco Use    Smoking status: Never    Smokeless tobacco: Never   Substance Use Topics    Alcohol use: Yes     Comment: occasional     Drug use: Never     Review of Systems   Constitutional:  Negative for activity change, chills, diaphoresis, fatigue and fever.   Respiratory:  Negative for cough and shortness of breath.    Cardiovascular:  Negative for chest pain and palpitations.   Gastrointestinal:  Positive for abdominal pain. Negative for constipation, diarrhea, nausea and vomiting.   Genitourinary:  Positive for vaginal bleeding and vaginal discharge. Negative for dysuria and hematuria.   Skin:  Negative for rash.   Neurological:  Negative for light-headedness and headaches.       Physical Exam     Initial Vitals   BP Pulse Resp Temp SpO2   23 1803 23 1800 23 1803 23 1803 23 1800   136/72 101 16 98.2 °F (36.8 °C) 99 %      MAP       --                Physical Exam    Nursing note and vitals reviewed.  Constitutional: She  appears well-developed and well-nourished. She is not diaphoretic. No distress.   HENT:   Head: Normocephalic and atraumatic.   Eyes: EOM are normal.   Neck:   Normal range of motion.  Cardiovascular:  Normal rate.           Pulmonary/Chest: No respiratory distress.   Musculoskeletal:         General: Normal range of motion.      Cervical back: Normal range of motion.     Neurological: She is alert and oriented to person, place, and time. She has normal strength.   Skin: Skin is warm and dry.   Psychiatric: She has a normal mood and affect. Her behavior is normal. Judgment and thought content normal.     OB LABOR EXAM:       Method: Sterile speculum exam per MD.   Vaginal Bleeding: bright red.               Comments: Mucosanguineous exudate observed from cervical os and timed w/ pt reported contractions.  Cerclage stings visualized at 12 o'clock position, moderate dimpling at knot face        ED Course   Procedures  Labs Reviewed   CBC W/ AUTO DIFFERENTIAL   URINALYSIS, REFLEX TO URINE CULTURE   POCT URINALYSIS, DIPSTICK OR TABLET REAGENT, AUTOMATED, WITH MICROSCOP   TYPE & SCREEN          Imaging Results    None            Medications   lactated ringers bolus 1,000 mL (has no administration in time range)     Medical Decision Making  Amount and/or Complexity of Data Reviewed  Labs: ordered.  Discussion of management or test interpretation with external provider(s): VSS  FHTs 160s    UA: 1+ leuks, 3+ ketones -> 1L D5LR    SSE concerning for blood-tinged exudate timed with contractions; cerclage strings under moderate tension.    Nitrazine positive  Concern for possible rupture vs intrauterine infection    Staff/Chief notified    U/S demonstrating MVP of 3.15 cm; breech presentation noted.  Most recent growth showing EFW 364g 19 days prior    Admit for likely removal of cerclage; [facility] staff on call notified  CBC/T+S ordered    Risk  Decision regarding hospitalization.              Attending Attestation:   Physician  Attestation Statement for Resident:  As the supervising MD   Physician Attestation Statement: I have personally seen and examined this patient.   I agree with the above history.  -:   As the supervising MD I agree with the above PE.     As the supervising MD I agree with the above treatment, course, plan, and disposition.   -: I agree with the above edited resident note. Pt seen and examined, chart and labs reviewed.    Briefly, 39 yo  at 22w6d presenting for vaginal bleeding and abdominal cramping with Campa cerclage in place. SSE with tension on knot. Udip with ketones--> IV hydration performed and if cramping does not resolve will discuss removal of cerclage with MFM. Will admit for observation at this time, labs pending at time of review.     All questions answered. Admit to [facility] for further mgmt.     Claudia Goodwin MD  OB Hospitalist  2023     I was personally present during the critical portions of the procedure(s) performed by the resident and was immediately available in the ED to provide services and assistance as needed during the entire procedure.  I have reviewed and agree with the residents interpretation of the following: lab data.  I have reviewed the following: old records at this facility.                           Medical Decision Making:   History:   Old Medical Records: I decided to obtain old medical records.  Old Records Summarized: records from clinic visits and records from previous admission(s).      Clinical Impression:   Final diagnoses:  [O26.892, R10.9] Abdominal pain during pregnancy in second trimester (Primary)  [Z3A.22] 22 weeks gestation of pregnancy               Umang Hay MD  Resident  23 1836       Umang Hay MD  Resident  23 1842

## 2023-09-26 NOTE — TELEPHONE ENCOUNTER
Call to patient with [facility] . No answer. Had  leave a voicemail stating for the patient to please go to L and D for her concerns.

## 2023-09-26 NOTE — TELEPHONE ENCOUNTER
"Complains of contraction like pains since this morning. Adds she had a procedure yesterday and had some cramping prior to leaving. Adds she has been spotting but has been spotting since procedure. Concerned about contraction pains about every 30 min -60 min since early this morning. Adds they are leaving town tomorrow and want to make sure everything is fine. No change in fetal movement. Pt reports some weakness today but is able to stand and walk to restroom. Care advice per protocol. Advised to call back with concerns or worsening symptoms. Verbalized understanding.     Reason for Disposition   Contractions and any vaginal bleeding (including: red blood, clots, spotting, or pink/brown mucous)    Additional Information   Negative: Passed out (i.e., fainted, collapsed and was not responding)   Negative: Shock suspected (e.g., cold/pale/clammy skin, too weak to stand, low BP, rapid pulse)   Negative: Difficult to awaken or acting confused (e.g., disoriented, slurred speech)   Negative: SEVERE constant abdominal pain (e.g., excruciating) and present > 1 hour   Negative: SEVERE bleeding (e.g., continuous red blood from vagina, or large blood clots)   Negative: Umbilical cord hanging out of the vagina (shiny, white, curled appearance, "like telephone cord")   Negative: Uncontrollable urge to push (i.e., feels like baby is coming out now)   Negative: Can see baby   Negative: Sounds like a life-threatening emergency to the triager   Negative: MODERATE-SEVERE abdominal pain   Negative: Contractions < 10 minutes apart for 1 hour (i.e., 6 or more contractions an hour)   Negative: Contractions > 10 minutes apart that persist > 24 hours, and no improvement using Care Advice    Protocols used: Pregnancy - Labor - [id number]    "

## 2023-09-26 NOTE — TELEPHONE ENCOUNTER
Pt called with assistance of ,  Margaret. Pt  called in stating that she had procedure Sunday- cerclage- d/c yesterday. Starting 10 am having contractions very 30 min. States she is 23 weeks pregnant. Pt states the provider called and told her they would prescribe abx and shots for contractions. States pharmacy only had abx- no meds for the contractions. Pt reports that the symptoms have not changed from previous triage done by this NT @221p. Pt initial dispo reinforced that she should go to L&D to be evaluated. Pt also advised pt that per My Chart Dr. Jain office advised that she be evaluated at L&D as well. Pt wants to get shot and states she is scared and does not want to go to the ED because it will take a long time. Pt advised again that in her my chart, Dr. Jain recommended evaluation at L&D. Pt states she will go to L&D now. No new triage completed- previous dispo and care directives reinforced. Pt advised to call back with concerns or worsening symptoms. Verbalized understanding.     Reason for Disposition   Caller has already spoken with another triager or PCP AND has further questions AND triager able to answer questions.    Protocols used: No Contact or Duplicate Contact Call-A-

## 2023-09-26 NOTE — TELEPHONE ENCOUNTER
----- Message from Jaleesa Mccarthy sent at 9/26/2023  3:34 PM CDT -----  Type: General Call Back     Name of Caller:JAYLIN MENDOSA [[id number]]  Symptoms:contractions 23 weeks   Would the patient rather a call back or a response via [facility]? Call back   Best Call Back Number:[phone]  Additional Information: Patient indicates she was seen in 9/24/23 for a procedure. Patient indicates   she was discharged 9/25/23. Patient indicates she was told she would be given a shot to stop her contractions but the patient indicates she never received that. Patient indicates she still is experiencing contractions and is highly concerned. Patient indicates she will be traveling out of state tomorrow 9/27/23 and is concerned. Patient indicates  she would like to speak with someone in the providers office as soon as possible. Patient transferred ton nurse on call for further assistance. Please call back with  on line.

## 2023-09-27 PROBLEM — O47.02 THREATENED PREMATURE LABOR IN SECOND TRIMESTER: Status: RESOLVED | Noted: 2023-09-26 | Resolved: 2023-09-27

## 2023-09-27 PROBLEM — O20.9 VAGINAL BLEEDING AFFECTING EARLY PREGNANCY: Status: ACTIVE | Noted: 2023-09-27

## 2023-09-27 LAB
BACTERIA #/AREA URNS HPF: ABNORMAL /HPF
BILIRUB UR QL STRIP: NEGATIVE
CLARITY UR: CLEAR
COLOR UR: YELLOW
GLUCOSE UR QL STRIP: NEGATIVE
HGB UR QL STRIP: ABNORMAL
KETONES UR QL STRIP: ABNORMAL
LEUKOCYTE ESTERASE UR QL STRIP: ABNORMAL
MICROSCOPIC COMMENT: ABNORMAL
NITRITE UR QL STRIP: NEGATIVE
PH UR STRIP: 7 [PH] (ref 5–8)
PROT UR QL STRIP: ABNORMAL
RBC #/AREA URNS HPF: 15 /HPF (ref 0–4)
SP GR UR STRIP: 1.02 (ref 1–1.03)
SQUAMOUS #/AREA URNS HPF: 4 /HPF
URN SPEC COLLECT METH UR: ABNORMAL
UROBILINOGEN UR STRIP-ACNC: NEGATIVE EU/DL
WBC #/AREA URNS HPF: 20 /HPF (ref 0–5)

## 2023-09-27 PROCEDURE — 63700000 PHARM REV CODE 250 ALT 637 W/O HCPCS

## 2023-09-27 PROCEDURE — 25000003 PHARM REV CODE 250: Performed by: STUDENT IN AN ORGANIZED HEALTH CARE EDUCATION/TRAINING PROGRAM

## 2023-09-27 PROCEDURE — 25000003 PHARM REV CODE 250

## 2023-09-27 PROCEDURE — 99233 PR SUBSEQUENT HOSPITAL CARE,LEVL III: ICD-10-PCS | Mod: ,,, | Performed by: STUDENT IN AN ORGANIZED HEALTH CARE EDUCATION/TRAINING PROGRAM

## 2023-09-27 PROCEDURE — 63600175 PHARM REV CODE 636 W HCPCS

## 2023-09-27 PROCEDURE — 11000001 HC ACUTE MED/SURG PRIVATE ROOM

## 2023-09-27 PROCEDURE — 99236 HOSP IP/OBS SAME DATE HI 85: CPT | Mod: 25,,, | Performed by: OBSTETRICS & GYNECOLOGY

## 2023-09-27 PROCEDURE — 59025 PR FETAL 2N-STRESS TEST: ICD-10-PCS | Mod: 26,,, | Performed by: OBSTETRICS & GYNECOLOGY

## 2023-09-27 PROCEDURE — 59025 FETAL NON-STRESS TEST: CPT | Mod: 26,,, | Performed by: OBSTETRICS & GYNECOLOGY

## 2023-09-27 PROCEDURE — 99233 SBSQ HOSP IP/OBS HIGH 50: CPT | Mod: ,,, | Performed by: STUDENT IN AN ORGANIZED HEALTH CARE EDUCATION/TRAINING PROGRAM

## 2023-09-27 PROCEDURE — 99236 PR OBSERV/HOSP SAME DATE,LEVL V: ICD-10-PCS | Mod: 25,,, | Performed by: OBSTETRICS & GYNECOLOGY

## 2023-09-27 RX ORDER — INDOMETHACIN 25 MG/1
25 CAPSULE ORAL EVERY 6 HOURS
Status: DISCONTINUED | OUTPATIENT
Start: 2023-09-27 | End: 2023-09-28 | Stop reason: HOSPADM

## 2023-09-27 RX ORDER — INDOMETHACIN 25 MG/1
25 CAPSULE ORAL EVERY 8 HOURS
Status: DISCONTINUED | OUTPATIENT
Start: 2023-09-27 | End: 2023-09-27

## 2023-09-27 RX ADMIN — METRONIDAZOLE 500 MG: 500 TABLET ORAL at 07:09

## 2023-09-27 RX ADMIN — CEPHALEXIN 500 MG: 500 CAPSULE ORAL at 06:09

## 2023-09-27 RX ADMIN — METRONIDAZOLE 500 MG: 500 TABLET ORAL at 08:09

## 2023-09-27 RX ADMIN — AZITHROMYCIN MONOHYDRATE 500 MG: 250 TABLET ORAL at 08:09

## 2023-09-27 RX ADMIN — CEPHALEXIN 500 MG: 500 CAPSULE ORAL at 09:09

## 2023-09-27 RX ADMIN — ASPIRIN 81 MG: 81 TABLET, COATED ORAL at 08:09

## 2023-09-27 RX ADMIN — PRENATAL VIT W/ FE FUMARATE-FA TAB 27-0.8 MG 1 TABLET: 27-0.8 TAB at 08:09

## 2023-09-27 RX ADMIN — INDOMETHACIN 25 MG: 25 CAPSULE ORAL at 02:09

## 2023-09-27 RX ADMIN — AZITHROMYCIN MONOHYDRATE 500 MG: 250 TABLET ORAL at 09:09

## 2023-09-27 RX ADMIN — DEXTROSE MONOHYDRATE 5 MILLION UNITS: 5 INJECTION INTRAVENOUS at 12:09

## 2023-09-27 RX ADMIN — MAGNESIUM SULFATE HEPTAHYDRATE 6 G: 40 INJECTION, SOLUTION INTRAVENOUS at 12:09

## 2023-09-27 RX ADMIN — MAGNESIUM SULFATE HEPTAHYDRATE 2 G/HR: 40 INJECTION, SOLUTION INTRAVENOUS at 12:09

## 2023-09-27 RX ADMIN — BETAMETHASONE SODIUM PHOSPHATE AND BETAMETHASONE ACETATE 12 MG: 3; 3 INJECTION, SUSPENSION INTRA-ARTICULAR; INTRALESIONAL; INTRAMUSCULAR at 07:09

## 2023-09-27 RX ADMIN — CEPHALEXIN 500 MG: 500 CAPSULE ORAL at 02:09

## 2023-09-27 RX ADMIN — INDOMETHACIN 25 MG: 25 CAPSULE ORAL at 08:09

## 2023-09-27 RX ADMIN — INDOMETHACIN 25 MG: 25 CAPSULE ORAL at 07:09

## 2023-09-27 NOTE — ASSESSMENT & PLAN NOTE
- Patient admitted for close observation due to vaginal bleeding in the setting of recent rescue cerclage placement  - Negative rupture exam  - Cerclage not on tension at this time  - CBC and T&S collected on admit  - Strict pad counts  - Blood transfusion consent form reviewed and signed

## 2023-09-27 NOTE — ASSESSMENT & PLAN NOTE
- POD#2 s/p Campa cerclage placement  - Cerclage not on tension at this time, therefore will maintain cerclage in place  - Will re-evaluate if clinical picture changes and determine if need for cerclage removal

## 2023-09-27 NOTE — ASSESSMENT & PLAN NOTE
- 22w6d gestation on admit  - Highly desired pregnancy and patient wishes for all interventions to stop bleeding/labor at this time  - Growth scan performed on admit with EFW 557g, breech presentation  - Labor and delivery consent forms reviewed and signed on admit  - Betamethasone course with indocin for tocolysis during steroid window; once completed, if patient stable will discharge

## 2023-09-27 NOTE — ASSESSMENT & PLAN NOTE
- 22w6d gestation on admit  - Highly desired pregnancy and patient wishes for all interventions to stop bleeding/labor at this time  - Growth scan performed on admit with EFW 557g, breech presentation  - Labor and delivery consent forms reviewed and signed on admit  - Betamethasone course with indocin for tocolysis during steroid window  - NST BID  - Neonatology consult in the morning   - Will start MgSO4 for fetal neuroprotection and PCN for GBS unknown if clinical status worsens

## 2023-09-27 NOTE — PLAN OF CARE
23 0828   OB SCREEN   Assessment Type Discharge Planning Brief Assessment   Source of Information health record   Received Prenatal Care Yes   Any indications/suspicions for Other (Comments)  (recurrent pregnancy loss)   Is this a teen pregnancy No   Is the baby in NICU No  (Should pt deliver during this admit, then  would be admitted to the NICU.)   Indication for adoption/Safe Haven No   Indication for DME/post-acute needs No   HIV (+) No   Any congenital  disorders No   Fetal demise/ death No     Patient admitted to L&D due to abdominal pain and monitoring. Pt has a hx of pregnancy losses. Patient has been screened for Social Work discharge planning needs. Based on documentation in medical record, no discharge planning needs are anticipated at this time. Should any discharge planning needs arise, please consult .

## 2023-09-27 NOTE — ANESTHESIA PREPROCEDURE EVALUATION
Janki Hilton is a 38 y.o. female  at 22w6d with an IUP complicated by low lying placenta and surgical insufficiency s/p Campa cerclage placement on 23.  She reports cyclic abdominopelvic pain that began this morning and which was accompanied by moderate spotting. She is being admitted for observation and possible cerclage removal.     OB History    Para Term  AB Living   5 1   1 3     SAB IAB Ectopic Multiple Live Births   3              # Outcome Date GA Lbr Harpal/2nd Weight Sex Delivery Anes PTL Lv   5 Current            4 SAB            3 SAB            2 SAB            1   22w0d              Wt Readings from Last 1 Encounters:   23 1743 75.3 kg (166 lb)       BP Readings from Last 3 Encounters:   23 (!) 123/56   23 111/70   23 110/72       Patient Active Problem List   Diagnosis    PCO (polycystic ovaries)    History of miscarriage, currently pregnant, second trimester    Multigravida of advanced maternal age in first trimester    Cervical insufficiency during pregnancy, antepartum    Recurrent pregnancy loss    22 weeks gestation of pregnancy    History of  delivery    Cervical cerclage suture present in second trimester    Threatened premature labor in second trimester       Past Surgical History:   Procedure Laterality Date    CERVICAL CERCLAGE N/A 2023    Procedure: CERCLAGE, CERVIX;  Surgeon: Torrey Jain MD;  [facility]: [facility];  Service: OB/GYN;  Laterality: N/A;       Social History     Socioeconomic History    Marital status:    Tobacco Use    Smoking status: Never    Smokeless tobacco: Never   Substance and Sexual Activity    Alcohol use: Yes     Comment: occasional     Drug use: Never    Sexual activity: Yes     Partners: Male     Birth control/protection: None         Chemistry        Component Value Date/Time     2023    K 3.2 (L) 2023     2023    CO2 18 (L)  "09/26/2023 1949    BUN 5 (L) 09/26/2023 1949    CREATININE 0.7 09/26/2023 1949     (H) 09/26/2023 1949        Component Value Date/Time    CALCIUM 9.3 09/26/2023 1949    ALKPHOS 165 (H) 09/26/2023 1949    AST 25 09/26/2023 1949    ALT 41 09/26/2023 1949    BILITOT 0.2 09/26/2023 1949    ESTGFRAFRICA >60 10/18/2021 0943    EGFRNONAA >60 10/18/2021 0943            Lab Results   Component Value Date    WBC 15.04 (H) 09/26/2023    HGB 12.3 09/26/2023    HCT 36.8 (L) 09/26/2023    MCV 88 09/26/2023     09/26/2023       No results for input(s): "PT", "INR", "PROTIME", "APTT" in the last 72 hours.      Pre-op Assessment    I have reviewed the Patient Summary Reports.     I have reviewed the Nursing Notes.    I have reviewed the Medications.     Review of Systems  Anesthesia Hx:  No problems with previous Anesthesia  History of prior surgery of interest to airway management or planning: Denies Family Hx of Anesthesia complications.   Denies Personal Hx of Anesthesia complications.   Hematology/Oncology:     Oncology Normal     Cardiovascular:   Denies Hypertension.   Denies Angina. ECG has been reviewed.    Pulmonary:   Denies Shortness of breath.  Denies Recent URI.    Renal/:  Renal/ Normal     Hepatic/GI:   Denies GERD. Denies Liver Disease.    Neurological:   Denies CVA. Denies Seizures.    Endocrine:  Endocrine Normal Denies Diabetes.        Physical Exam  General: Well nourished, Cooperative and Alert    Airway:  Mallampati: II   Mouth Opening: Normal  TM Distance: Normal  Tongue: Normal  Neck ROM: Normal ROM    Dental:  Intact        Anesthesia Plan  Type of Anesthesia, risks & benefits discussed:    Anesthesia Type: Gen ETT, Epidural, Spinal, CSE  Intra-op Monitoring Plan: Standard ASA Monitors  Post Op Pain Control Plan: multimodal analgesia  Induction:  IV  Airway Plan: Direct, Post-Induction  Informed Consent: Informed consent signed with the Patient and all parties understand the risks and " agree with anesthesia plan.  All questions answered.   ASA Score: 3  Day of Surgery Review of History & Physical: H&P Update referred to the surgeon/provider.    Ready For Surgery From Anesthesia Perspective.     .

## 2023-09-27 NOTE — SUBJECTIVE & OBJECTIVE
Obstetric HPI:  This pregnancy has been complicated by recurrent pregnancy loss, AMA, h/o  delivery in previous pregnancy (22w, endorses contractions followed by cervical change and subsequent delivery).    Minimal vaginal bleeding. No contractions. Some abdominal pain. Feels fetal movement. No leakage of fluid.     OB History    Para Term  AB Living   5 1 0 1 3 0   SAB IAB Ectopic Multiple Live Births   3 0 0 0 0      # Outcome Date GA Lbr Harpal/2nd Weight Sex Delivery Anes PTL Lv   5 Current            4 SAB            3 SAB            2 SAB            1   22w0d            No past medical history on file.  Past Surgical History:   Procedure Laterality Date    CERVICAL CERCLAGE N/A 2023    Procedure: CERCLAGE, CERVIX;  Surgeon: Torrey Jain MD;  Location: [facility] L&D;  Service: OB/GYN;  Laterality: N/A;       PTA Medications   Medication Sig    aspirin (ECOTRIN) 81 MG EC tablet Take 1 tablet (81 mg total) by mouth once daily.    azithromycin (ZITHROMAX) 500 MG tablet Take 1 tablet (500 mg total) by mouth 2 (two) times a day. for 9 days    cephALEXin (KEFLEX) 500 MG capsule Take 1 capsule (500 mg total) by mouth every 8 (eight) hours. for 9 days    metroNIDAZOLE (FLAGYL) 500 MG tablet Take 1 tablet (500 mg total) by mouth every 12 (twelve) hours. for 9 days    NIFEdipine (PROCARDIA) 10 MG Cap Take 1 capsule (10 mg total) by mouth every 4 (four) hours.    PNV,calcium 72-iron-folic acid (PRENATAL VITAMIN PLUS LOW IRON) 27 mg iron- 1 mg Tab Take one tablet daily.  Prescribe prenatal covered by insurance    prenatal vit-iron fum-folic ac 65 mg iron- 1 mg Tab Take by mouth.       Review of patient's allergies indicates:  No Known Allergies     Family History    None       Tobacco Use    Smoking status: Never    Smokeless tobacco: Never   Substance and Sexual Activity    Alcohol use: Yes     Comment: occasional     Drug use: Never    Sexual activity: Yes     Partners: Male     Birth  control/protection: None     Review of Systems   Constitutional:  Negative for chills and fever.   Respiratory:  Negative for shortness of breath.    Cardiovascular:  Negative for chest pain.   Gastrointestinal:  Positive for abdominal pain. Negative for nausea and vomiting.   Endocrine: Negative for hot flashes.   Genitourinary:  Positive for pelvic pain. Negative for dysuria and vaginal bleeding.   Integumentary:  Negative for breast mass, nipple discharge and breast skin changes.   Neurological:  Negative for headaches.   Hematological:  Does not bruise/bleed easily.   Psychiatric/Behavioral:  Negative for depression.    Breast: Negative for mass, mastodynia, nipple discharge and skin changes     Objective:     Vital Signs (Most Recent):  Temp: 98.1 °F (36.7 °C) (09/27/23 0719)  Pulse: 86 (09/27/23 0719)  Resp: 18 (09/27/23 0719)  BP: 105/66 (09/27/23 0719)  SpO2: 98 % (09/27/23 0719) Vital Signs (24h Range):  Temp:  [97.3 °F (36.3 °C)-98.4 °F (36.9 °C)] 98.1 °F (36.7 °C)  Pulse:  [] 86  Resp:  [16-18] 18  SpO2:  [97 %-100 %] 98 %  BP: (105-147)/(51-74) 105/66     Weight: 75.3 kg (166 lb)  Body mass index is 27.62 kg/m².    FHT: qD; patient has elevated to not complete monitoring at this time   TOCO:  irregular     Physical Exam:   Constitutional: She is oriented to person, place, and time. She appears well-developed and well-nourished. No distress.    HENT:   Head: Normocephalic and atraumatic.    Eyes: Pupils are equal, round, and reactive to light. Conjunctivae are normal.     Cardiovascular:  Normal rate.             Pulmonary/Chest: Effort normal. No respiratory distress.        Abdominal: Soft. She exhibits no distension. There is no abdominal tenderness.     Genitourinary:    Genitourinary Comments: Blood-tinged vaginal discharge noted, approximately 8 procto swabs used as production continued with contractions. Cerclage not on tension. (09/26)              Musculoskeletal: Normal range of motion.        Neurological: She is alert and oriented to person, place, and time.     Psychiatric: She has a normal mood and affect. Thought content normal.        Cervix:  Dilation:  External os closed  Effacement:  50%  Station: -3  Presentation: Breech     Significant Labs:  Lab Results   Component Value Date    GROUPTRH O POS 09/26/2023    HEPBSAG Non-reactive 05/29/2023       CBC:   Recent Labs   Lab 09/26/23  1821   WBC 15.04*   RBC 4.19   HGB 12.3   HCT 36.8*      MCV 88   MCH 29.4   MCHC 33.4       I have personallly reviewed all pertinent lab results from the last 24 hours.

## 2023-09-27 NOTE — HPI
Janki Hilton is a 38 y.o. [id number] at 22w6d presents complaining of vaginal bleeding and abdominal cramping.  She reports cyclic abdominopelvic pain that began this morning and which was accompanied by moderate spotting.  She says she has gone through two panty liners today both with bright blood and w/o clots.  She describes her abdominopelvic pain and coming and going every 6-8 min and lasting 3 seconds or so.    The patient is POD#2 s/p rescue cerclage placement. At the time of placement the membranes appeared to be bulging at the external os and the os appeared approximately 3 cm dilated.  Membranes were slightly cloudy but they were not opaque and there was no evidence of overt infection or abnormal discharge. A Campa cerclage was successfully placed and the patient was discharged home on POD#1 with PO antibiotics and in stable condition.

## 2023-09-27 NOTE — SUBJECTIVE & OBJECTIVE
Obstetric HPI:    This pregnancy has been complicated by recurrent pregnancy loss, AMA, h/o  delivery in previous pregnancy (22w, endorses contractions followed by cervical change and subsequent delivery).    OB History    Para Term  AB Living   5 1 0 1 3 0   SAB IAB Ectopic Multiple Live Births   3 0 0 0 0      # Outcome Date GA Lbr Harpal/2nd Weight Sex Delivery Anes PTL Lv   5 Current            4 SAB            3 SAB            2 SAB            1   22w0d            No past medical history on file.  Past Surgical History:   Procedure Laterality Date    CERVICAL CERCLAGE N/A 2023    Procedure: CERCLAGE, CERVIX;  Surgeon: Torrey Jain MD;  Location: [facility] L&D;  Service: OB/GYN;  Laterality: N/A;       PTA Medications   Medication Sig    aspirin (ECOTRIN) 81 MG EC tablet Take 1 tablet (81 mg total) by mouth once daily.    azithromycin (ZITHROMAX) 500 MG tablet Take 1 tablet (500 mg total) by mouth 2 (two) times a day. for 9 days    cephALEXin (KEFLEX) 500 MG capsule Take 1 capsule (500 mg total) by mouth every 8 (eight) hours. for 9 days    metroNIDAZOLE (FLAGYL) 500 MG tablet Take 1 tablet (500 mg total) by mouth every 12 (twelve) hours. for 9 days    NIFEdipine (PROCARDIA) 10 MG Cap Take 1 capsule (10 mg total) by mouth every 4 (four) hours.    PNV,calcium 72-iron-folic acid (PRENATAL VITAMIN PLUS LOW IRON) 27 mg iron- 1 mg Tab Take one tablet daily.  Prescribe prenatal covered by insurance    prenatal vit-iron fum-folic ac 65 mg iron- 1 mg Tab Take by mouth.       Review of patient's allergies indicates:  No Known Allergies     Family History    None       Tobacco Use    Smoking status: Never    Smokeless tobacco: Never   Substance and Sexual Activity    Alcohol use: Yes     Comment: occasional     Drug use: Never    Sexual activity: Yes     Partners: Male     Birth control/protection: None     Review of Systems   Constitutional:  Negative for chills and fever.   Respiratory:   Negative for shortness of breath.    Cardiovascular:  Negative for chest pain.   Gastrointestinal:  Positive for abdominal pain. Negative for nausea and vomiting.   Endocrine: Negative for hot flashes.   Genitourinary:  Positive for pelvic pain. Negative for dysuria and vaginal bleeding.   Integumentary:  Negative for breast mass, nipple discharge and breast skin changes.   Neurological:  Negative for headaches.   Hematological:  Does not bruise/bleed easily.   Psychiatric/Behavioral:  Negative for depression.    Breast: Negative for mass, mastodynia, nipple discharge and skin changes     Objective:     Vital Signs (Most Recent):  Temp: 98.4 °F (36.9 °C) (09/27/23 0028)  Pulse: 97 (09/27/23 0042)  Resp: 18 (09/27/23 0028)  BP: (!) 112/59 (09/27/23 0028)  SpO2: 99 % (09/27/23 0042) Vital Signs (24h Range):  Temp:  [97.3 °F (36.3 °C)-98.4 °F (36.9 °C)] 98.4 °F (36.9 °C)  Pulse:  [] 97  Resp:  [16-18] 18  SpO2:  [98 %-100 %] 99 %  BP: (109-147)/(54-74) 112/59     Weight: 75.3 kg (166 lb)  Body mass index is 27.62 kg/m².    FHT: Cat 1 (reassuring) baseline 140, moderate variability, +acels, -decels  TOCO:  irregular     Physical Exam:   Constitutional: She is oriented to person, place, and time. She appears well-developed and well-nourished. No distress.    HENT:   Head: Normocephalic and atraumatic.    Eyes: Pupils are equal, round, and reactive to light. Conjunctivae are normal.     Cardiovascular:  Normal rate.             Pulmonary/Chest: Effort normal. No respiratory distress.        Abdominal: Soft. She exhibits no distension. There is no abdominal tenderness.     Genitourinary:    Genitourinary Comments: Blood-tinged vaginal discharge noted, approximately 8 procto swabs used as production continued with contractions. Cerclage not on tension.              Musculoskeletal: Normal range of motion.       Neurological: She is alert and oriented to person, place, and time.     Psychiatric: She has a normal mood  and affect. Thought content normal.        Cervix:  Dilation:  External os closed  Effacement:  50%  Station: -3  Presentation: Breech     Significant Labs:  Lab Results   Component Value Date    GROUPTRH O POS 09/26/2023    HEPBSAG Non-reactive 05/29/2023       CBC:   Recent Labs   Lab 09/26/23  1821   WBC 15.04*   RBC 4.19   HGB 12.3   HCT 36.8*      MCV 88   MCH 29.4   MCHC 33.4     I have personallly reviewed all pertinent lab results from the last 24 hours.

## 2023-09-27 NOTE — H&P
[facility] - Labor & Delivery  Obstetrics  History & Physical    Patient Name: Janki Hilton  MRN: [id number]  Admission Date: 2023  Primary Care Provider: Enrique Bal Jr., MD    Subjective:     Principal Problem:Vaginal bleeding affecting early pregnancy    History of Present Illness:  Janki Hilton is a 38 y.o. [id number] at 22w6d presents complaining of vaginal bleeding and abdominal cramping.  She reports cyclic abdominopelvic pain that began this morning and which was accompanied by moderate spotting.  She says she has gone through two panty liners today both with bright blood and w/o clots.  She describes her abdominopelvic pain and coming and going every 6-8 min and lasting 3 seconds or so.    The patient is POD#2 s/p rescue cerclage placement. At the time of placement the membranes appeared to be bulging at the external os and the os appeared approximately 3 cm dilated.  Membranes were slightly cloudy but they were not opaque and there was no evidence of overt infection or abnormal discharge. A Capma cerclage was successfully placed and the patient was discharged home on POD#1 with PO antibiotics and in stable condition.          Obstetric HPI:    This pregnancy has been complicated by recurrent pregnancy loss, AMA, h/o  delivery in previous pregnancy (22w, endorses contractions followed by cervical change and subsequent delivery).    OB History    Para Term  AB Living   5 1 0 1 3 0   SAB IAB Ectopic Multiple Live Births   3 0 0 0 0      # Outcome Date GA Lbr Harpal/2nd Weight Sex Delivery Anes PTL Lv   5 Current            4 SAB            3 SAB            2 SAB            1   22w0d            No past medical history on file.  Past Surgical History:   Procedure Laterality Date    CERVICAL CERCLAGE N/A 2023    Procedure: CERCLAGE, CERVIX;  Surgeon: Torrey Jain MD;  Location: [facility] L&D;  Service: OB/GYN;  Laterality: N/A;       PTA Medications   Medication Sig     aspirin (ECOTRIN) 81 MG EC tablet Take 1 tablet (81 mg total) by mouth once daily.    azithromycin (ZITHROMAX) 500 MG tablet Take 1 tablet (500 mg total) by mouth 2 (two) times a day. for 9 days    cephALEXin (KEFLEX) 500 MG capsule Take 1 capsule (500 mg total) by mouth every 8 (eight) hours. for 9 days    metroNIDAZOLE (FLAGYL) 500 MG tablet Take 1 tablet (500 mg total) by mouth every 12 (twelve) hours. for 9 days    NIFEdipine (PROCARDIA) 10 MG Cap Take 1 capsule (10 mg total) by mouth every 4 (four) hours.    PNV,calcium 72-iron-folic acid (PRENATAL VITAMIN PLUS LOW IRON) 27 mg iron- 1 mg Tab Take one tablet daily.  Prescribe prenatal covered by insurance    prenatal vit-iron fum-folic ac 65 mg iron- 1 mg Tab Take by mouth.       Review of patient's allergies indicates:  No Known Allergies     Family History    None       Tobacco Use    Smoking status: Never    Smokeless tobacco: Never   Substance and Sexual Activity    Alcohol use: Yes     Comment: occasional     Drug use: Never    Sexual activity: Yes     Partners: Male     Birth control/protection: None     Review of Systems   Constitutional:  Negative for chills and fever.   Respiratory:  Negative for shortness of breath.    Cardiovascular:  Negative for chest pain.   Gastrointestinal:  Positive for abdominal pain. Negative for nausea and vomiting.   Endocrine: Negative for hot flashes.   Genitourinary:  Positive for pelvic pain. Negative for dysuria and vaginal bleeding.   Integumentary:  Negative for breast mass, nipple discharge and breast skin changes.   Neurological:  Negative for headaches.   Hematological:  Does not bruise/bleed easily.   Psychiatric/Behavioral:  Negative for depression.    Breast: Negative for mass, mastodynia, nipple discharge and skin changes     Objective:     Vital Signs (Most Recent):  Temp: 98.4 °F (36.9 °C) (09/27/23 0028)  Pulse: 97 (09/27/23 0042)  Resp: 18 (09/27/23 0028)  BP: (!) 112/59 (09/27/23 0028)  SpO2: 99  % (23 0042) Vital Signs (24h Range):  Temp:  [97.3 °F (36.3 °C)-98.4 °F (36.9 °C)] 98.4 °F (36.9 °C)  Pulse:  [] 97  Resp:  [16-18] 18  SpO2:  [98 %-100 %] 99 %  BP: (109-147)/(54-74) 112/59     Weight: 75.3 kg (166 lb)  Body mass index is 27.62 kg/m².    FHT: Cat 1 (reassuring) baseline 140, moderate variability, +acels, -decels  TOCO:  irregular     Physical Exam:   Constitutional: She is oriented to person, place, and time. She appears well-developed and well-nourished. No distress.    HENT:   Head: Normocephalic and atraumatic.    Eyes: Pupils are equal, round, and reactive to light. Conjunctivae are normal.     Cardiovascular:  Normal rate.             Pulmonary/Chest: Effort normal. No respiratory distress.        Abdominal: Soft. She exhibits no distension. There is no abdominal tenderness.     Genitourinary:    Genitourinary Comments: Blood-tinged vaginal discharge noted, approximately 8 procto swabs used as production continued with contractions. Cerclage not on tension.              Musculoskeletal: Normal range of motion.       Neurological: She is alert and oriented to person, place, and time.     Psychiatric: She has a normal mood and affect. Thought content normal.        Cervix:  Dilation:  External os closed  Effacement:  50%  Station: -3  Presentation: Breech     Significant Labs:  Lab Results   Component Value Date    GROUPTRH O POS 2023    HEPBSAG Non-reactive 2023       CBC:   Recent Labs   Lab 23  1821   WBC 15.04*   RBC 4.19   HGB 12.3   HCT 36.8*      MCV 88   MCH 29.4   MCHC 33.4     I have personallly reviewed all pertinent lab results from the last 24 hours.    Assessment/Plan:     38 y.o. female  at 23w0d for:    * Vaginal bleeding affecting early pregnancy  - Patient admitted for close observation due to vaginal bleeding in the setting of recent rescue cerclage placement  - Negative rupture exam  - Cerclage not on tension at this time  - CBC and  T&S collected on admit  - Strict pad counts  - Blood transfusion consent form reviewed and signed    Cervical cerclage suture present in second trimester  - POD#2 s/p Campa cerclage placement  - Cerclage not on tension at this time, therefore will maintain cerclage in place  - Will re-evaluate if clinical picture changes and determine if need for cerclage removal    22 weeks gestation of pregnancy  - 22w6d gestation on admit  - Highly desired pregnancy and patient wishes for all interventions to stop bleeding/labor at this time  - Growth scan performed on admit with EFW [id number], breech presentation  - Labor and delivery consent forms reviewed and signed on admit  - Betamethasone course with indocin for tocolysis during steroid window  - NST BID  - Neonatology consult in the morning   - Will start MgSO4 for fetal neuroprotection and PCN for GBS unknown if clinical status worsens    Multigravida of advanced maternal age in first trimester  - cfDNA negative        Gauri King MD  Obstetrics  [facility] - Labor & Delivery

## 2023-09-27 NOTE — ASSESSMENT & PLAN NOTE
- POD#3 s/p Katheryn cerclage placement  - Cerclage not on tension at this time, therefore will maintain cerclage in place  - Will re-evaluate if clinical picture changes and determine if need for cerclage removal

## 2023-09-27 NOTE — PLAN OF CARE
Problem: Adult Inpatient Plan of Care  Goal: Plan of Care Review  9/27/2023 0632 by Kandice Choi RN  Outcome: Ongoing, Progressing  9/27/2023 0631 by Kandice Choi RN  Outcome: Ongoing, Progressing     Problem: Adult Inpatient Plan of Care  Goal: Patient-Specific Goal (Individualized)  9/27/2023 0632 by Kandice Choi RN  Outcome: Ongoing, Progressing  9/27/2023 0631 by Kandice Choi RN  Outcome: Ongoing, Progressing     Problem: Adult Inpatient Plan of Care  Goal: Absence of Hospital-Acquired Illness or Injury  9/27/2023 0632 by Kandice Choi RN  Outcome: Ongoing, Progressing  9/27/2023 0631 by Kandice Choi RN  Outcome: Ongoing, Progressing     Problem: Adult Inpatient Plan of Care  Goal: Optimal Comfort and Wellbeing  9/27/2023 0632 by Kandice Choi RN  Outcome: Ongoing, Progressing  9/27/2023 0631 by Kandice Choi RN  Outcome: Ongoing, Progressing     Problem: Adult Inpatient Plan of Care  Goal: Readiness for Transition of Care  9/27/2023 0632 by Kandice Choi RN  Outcome: Ongoing, Progressing  9/27/2023 0631 by Kandice Choi RN  Outcome: Ongoing, Progressing

## 2023-09-27 NOTE — CONSULTS
Neonatology   Consult and Delivery Plan Note    Asked to meet with , who is the expectant parents of a lindsey female, due to a history of vaginal bleeding s/p cerclage placement   premature cervical dilatation and having reached 23 week gestation.     I visited with the patient and her  was on speaker phone in room B 396 on 2023. At the time of our meeting, the gestation was known to be 23 week.   is a 38 year old.  blood type O positive  female who had been followed by Enrique Hines Jr., MD and then had her care transferred to [facility]. Maternal medications during this pregnancy included metformin, progesterone,aspirin. grams (growth scan performed on admission)    There is not a history of tobacco usage. There is not a history of ethanol usage. There is not a history of or recreational drug usage during this pregnancy.  Maternal testing for Hepatitis B was negative,  Maternal testing for syphilis was negative and Maternal testing for HIV was negative. Steroid therapy has been given and is due to receive her second dose on     The chance of survival of a 23 week gestation was reported to parents to be approximately 50% (national average).  I reviewed the complications of delivery and care of a baby born at 23 weeks gestation which could include breathing problems (surfactant deficiency requiring mechanical ventilation), infection, intracranial bleeding, retinopathy of prematurity, and feeding difficulties.  The fact that she was already here, (meaning that we were not having to transport the baby by ground or air) under the care of a perinatologist and that she has agreed to provide her baby with breast milk would certainly have a positive influence on outcome. I stressed that survival meant that the baby was able to be discharged home from the NICU but that it did not imply a normal neurologic outcome.  Complications of being born and  surviving could include but not be limited to intellectual disability, cerebral palsy, chronic lung disease, blindness, or deafness.    We also discussed the following options of trial of intensive care vs comfort care and at this time the family would like to opt for trial of intensive care.      would like to provide breast milk for her baby and used formula as a bridge. We discussed risks associated with NEC and formula use and gave her the option of donor breast milk to be used.    Thank you for the opportunity to meet , and I will share this information with our team.        Ninoska Otto MD  Neonatology   [facility] ([place])      The time spent visiting with the patient and preparing this report was 1 hour.

## 2023-09-27 NOTE — ASSESSMENT & PLAN NOTE
- Vaginal bleeding stable, minimal amount of pad   - CBC stable   - Multiple cervical exam stable findings   - Will continue to closely monitor

## 2023-09-27 NOTE — CARE UPDATE
MD to bedside to assess vaginal bleeding. Patient noted to have moderate amount of blood on chaim pad and between her legs. On speculum exam, copious amount of blood and mucus in vaginal vault. Blood removed and cervix remains closed and cerclage not on tension. On SVE, external os closed without active bleeding. Patient reports intermittent uterine cramping which she rates 2-3/10 on the pain scale. She endorses having significantly more discomfort from her back where she has recently anesthesia placed.    Silvis: Irregular contractions, 1-2 ctx visualized q20 mins    Discussed fetal monitoring with patient as fetus will be 23 weeks at midnight. Explained the course of action if proceed with fetal heart monitoring and monitoring is not reassuring. In the case of such, recommended course of action will likely be delivery. Also discussed the option of deferring fetal heart monitoring and the associated risks. After discussion, the patient decided to defer fetal heart monitoring at this time. All questions answered.      Plan:   Defer fetal heart monitoring at this time.  Given increased vaginal bleeding, will initiate MgSo4 for fetal neuro protection and PCN for GBS prophylaxis in the event removal of the cerclage is imminent.   Will continue to monitor bleeding. Cerclage to stay in place at this time.     Marichuy Macias MD PGY-2  Obstetrics and Gynecology

## 2023-09-27 NOTE — PROGRESS NOTES
[facility] - Antepartum  Obstetrics  Antepartum Progress Note    Patient Name: Janki Hilton  MRN: [id number]  Admission Date: 2023  Hospital Length of Stay: 1 days  Attending Physician: Sher Crowley MD  Primary Care Provider: Enrique Bal Jr., MD    Subjective:     Principal Problem:Vaginal bleeding affecting early pregnancy    HPI:  Janki Hilton is a 38 y.o. [id number] at 22w6d presents complaining of vaginal bleeding and abdominal cramping.  She reports cyclic abdominopelvic pain that began this morning and which was accompanied by moderate spotting.  She says she has gone through two panty liners today both with bright blood and w/o clots.  She describes her abdominopelvic pain and coming and going every 6-8 min and lasting 3 seconds or so.    The patient is POD#2 s/p rescue cerclage placement. At the time of placement the membranes appeared to be bulging at the external os and the os appeared approximately 3 cm dilated.  Membranes were slightly cloudy but they were not opaque and there was no evidence of overt infection or abnormal discharge. A Campa cerclage was successfully placed and the patient was discharged home on POD#1 with PO antibiotics and in stable condition.    Hospital Course:  2023: Patient admitted for close observation due to vaginal bleeding and abdominal cramping in the setting of recent rescue cerclage placement. BMZ administration, tocolytics, and continuous TOCO.   2023: HD#2. Admitted for vaginal bleeding/ abdominal cramping in the setting of cerclage. VSS. TOCO infrequent contractions. Will continue to monitor. Will complete steroid course and tocolytics until through steroid window.       Obstetric HPI:  This pregnancy has been complicated by recurrent pregnancy loss, AMA, h/o  delivery in previous pregnancy (22w, endorses contractions followed by cervical change and subsequent delivery).    Minimal vaginal bleeding. No contractions. Some abdominal pain.  Feels fetal movement. No leakage of fluid.     OB History    Para Term  AB Living   5 1 0 1 3 0   SAB IAB Ectopic Multiple Live Births   3 0 0 0 0      # Outcome Date GA Lbr Harpal/2nd Weight Sex Delivery Anes PTL Lv   5 Current            4 SAB            3 SAB            2 SAB            1   22w0d            No past medical history on file.  Past Surgical History:   Procedure Laterality Date    CERVICAL CERCLAGE N/A 2023    Procedure: CERCLAGE, CERVIX;  Surgeon: Torrey Jain MD;  Location: [facility];  Service: OB/GYN;  Laterality: N/A;       PTA Medications   Medication Sig    aspirin (ECOTRIN) 81 MG EC tablet Take 1 tablet (81 mg total) by mouth once daily.    azithromycin (ZITHROMAX) 500 MG tablet Take 1 tablet (500 mg total) by mouth 2 (two) times a day. for 9 days    cephALEXin (KEFLEX) 500 MG capsule Take 1 capsule (500 mg total) by mouth every 8 (eight) hours. for 9 days    metroNIDAZOLE (FLAGYL) 500 MG tablet Take 1 tablet (500 mg total) by mouth every 12 (twelve) hours. for 9 days    NIFEdipine (PROCARDIA) 10 MG Cap Take 1 capsule (10 mg total) by mouth every 4 (four) hours.    PNV,calcium 72-iron-folic acid (PRENATAL VITAMIN PLUS LOW IRON) 27 mg iron- 1 mg Tab Take one tablet daily.  Prescribe prenatal covered by insurance    prenatal vit-iron fum-folic ac 65 mg iron- 1 mg Tab Take by mouth.       Review of patient's allergies indicates:  No Known Allergies     Family History    None       Tobacco Use    Smoking status: Never    Smokeless tobacco: Never   Substance and Sexual Activity    Alcohol use: Yes     Comment: occasional     Drug use: Never    Sexual activity: Yes     Partners: Male     Birth control/protection: None     Review of Systems   Constitutional:  Negative for chills and fever.   Respiratory:  Negative for shortness of breath.    Cardiovascular:  Negative for chest pain.   Gastrointestinal:  Positive for abdominal pain. Negative for nausea and  vomiting.   Endocrine: Negative for hot flashes.   Genitourinary:  Positive for pelvic pain. Negative for dysuria and vaginal bleeding.   Integumentary:  Negative for breast mass, nipple discharge and breast skin changes.   Neurological:  Negative for headaches.   Hematological:  Does not bruise/bleed easily.   Psychiatric/Behavioral:  Negative for depression.    Breast: Negative for mass, mastodynia, nipple discharge and skin changes     Objective:     Vital Signs (Most Recent):  Temp: 98.1 °F (36.7 °C) (09/27/23 0719)  Pulse: 86 (09/27/23 0719)  Resp: 18 (09/27/23 0719)  BP: 105/66 (09/27/23 0719)  SpO2: 98 % (09/27/23 0719) Vital Signs (24h Range):  Temp:  [97.3 °F (36.3 °C)-98.4 °F (36.9 °C)] 98.1 °F (36.7 °C)  Pulse:  [] 86  Resp:  [16-18] 18  SpO2:  [97 %-100 %] 98 %  BP: (105-147)/(51-74) 105/66     Weight: 75.3 kg (166 lb)  Body mass index is 27.62 kg/m².    FHT: qD; patient has elevated to not complete monitoring at this time   TOCO:  irregular     Physical Exam:   Constitutional: She is oriented to person, place, and time. She appears well-developed and well-nourished. No distress.    HENT:   Head: Normocephalic and atraumatic.    Eyes: Pupils are equal, round, and reactive to light. Conjunctivae are normal.     Cardiovascular:  Normal rate.             Pulmonary/Chest: Effort normal. No respiratory distress.        Abdominal: Soft. She exhibits no distension. There is no abdominal tenderness.     Genitourinary:    Genitourinary Comments: Blood-tinged vaginal discharge noted, approximately 8 procto swabs used as production continued with contractions. Cerclage not on tension. (09/26)              Musculoskeletal: Normal range of motion.       Neurological: She is alert and oriented to person, place, and time.     Psychiatric: She has a normal mood and affect. Thought content normal.        Cervix:  Dilation:  External os closed  Effacement:  50%  Station: -3  Presentation: Breech     Significant  Labs:  Lab Results   Component Value Date    GROUPTRH O POS 2023    HEPBSAG Non-reactive 2023       CBC:   Recent Labs   Lab 23  1821   WBC 15.04*   RBC 4.19   HGB 12.3   HCT 36.8*      MCV 88   MCH 29.4   MCHC 33.4       I have personallly reviewed all pertinent lab results from the last 24 hours.    Assessment/Plan:     38 y.o. female  at 23w0d for:    * Vaginal bleeding affecting early pregnancy  - Vaginal bleeding stable, minimal amount of pad   - CBC stable   - Multiple cervical exam stable findings   - Will continue to closely monitor     Cervical cerclage suture present in second trimester  - POD#3 s/p Campa cerclage placement  - Cerclage not on tension at this time, therefore will maintain cerclage in place  - Will re-evaluate if clinical picture changes and determine if need for cerclage removal    22 weeks gestation of pregnancy  - 22w6d gestation on admit  - Highly desired pregnancy and patient wishes for all interventions to stop bleeding/labor at this time  - Growth scan performed on admit with EFW 557g, breech presentation  - Labor and delivery consent forms reviewed and signed on admit  - Betamethasone course with indocin for tocolysis during steroid window; once completed, if patient stable will discharge    Multigravida of advanced maternal age in first trimester  - cfDNA negative    Shreya Avendano MD  Obstetrics  [facility] - Antepartum

## 2023-09-27 NOTE — HOSPITAL COURSE
09/26/2023: Patient admitted for close observation due to vaginal bleeding and abdominal cramping in the setting of recent rescue cerclage placement. BMZ administration, tocolytics, and continuous TOCO.   09/27/2023: HD#2. Admitted for vaginal bleeding/ abdominal cramping in the setting of cerclage. VSS. TOCO infrequent contractions. Will continue to monitor. Will complete steroid course and tocolytics until through steroid window.   09/28/2023: HD#3. NAEO. VSS. EFM/TOCO RR/NO CTX. Will discharge today. MFM in [place] scheduled.

## 2023-09-28 VITALS
RESPIRATION RATE: 18 BRPM | HEART RATE: 85 BPM | OXYGEN SATURATION: 99 % | HEIGHT: 65 IN | SYSTOLIC BLOOD PRESSURE: 112 MMHG | DIASTOLIC BLOOD PRESSURE: 59 MMHG | BODY MASS INDEX: 27.66 KG/M2 | TEMPERATURE: 98 F | WEIGHT: 166 LBS

## 2023-09-28 LAB — BACTERIA UR CULT: NO GROWTH

## 2023-09-28 PROCEDURE — 99238 HOSP IP/OBS DSCHRG MGMT 30/<: CPT | Mod: 25,,, | Performed by: OBSTETRICS & GYNECOLOGY

## 2023-09-28 PROCEDURE — 59025 FETAL NON-STRESS TEST: CPT | Mod: 26,,, | Performed by: OBSTETRICS & GYNECOLOGY

## 2023-09-28 PROCEDURE — 59025 PR FETAL 2N-STRESS TEST: ICD-10-PCS | Mod: 26,,, | Performed by: OBSTETRICS & GYNECOLOGY

## 2023-09-28 PROCEDURE — 25000003 PHARM REV CODE 250

## 2023-09-28 PROCEDURE — 25000003 PHARM REV CODE 250: Performed by: STUDENT IN AN ORGANIZED HEALTH CARE EDUCATION/TRAINING PROGRAM

## 2023-09-28 PROCEDURE — 63700000 PHARM REV CODE 250 ALT 637 W/O HCPCS

## 2023-09-28 PROCEDURE — 99238 PR HOSPITAL DISCHARGE DAY,<30 MIN: ICD-10-PCS | Mod: 25,,, | Performed by: OBSTETRICS & GYNECOLOGY

## 2023-09-28 RX ADMIN — PRENATAL VIT W/ FE FUMARATE-FA TAB 27-0.8 MG 1 TABLET: 27-0.8 TAB at 08:09

## 2023-09-28 RX ADMIN — METRONIDAZOLE 500 MG: 500 TABLET ORAL at 08:09

## 2023-09-28 RX ADMIN — ASPIRIN 81 MG: 81 TABLET, COATED ORAL at 08:09

## 2023-09-28 RX ADMIN — AZITHROMYCIN MONOHYDRATE 500 MG: 250 TABLET ORAL at 08:09

## 2023-09-28 RX ADMIN — CEPHALEXIN 500 MG: 500 CAPSULE ORAL at 06:09

## 2023-09-28 RX ADMIN — INDOMETHACIN 25 MG: 25 CAPSULE ORAL at 08:09

## 2023-09-28 RX ADMIN — INDOMETHACIN 25 MG: 25 CAPSULE ORAL at 02:09

## 2023-09-28 NOTE — DISCHARGE SUMMARY
[facility] - Antepartum  Obstetrics  Discharge Summary      Patient Name: Janki Hilton  MRN: [id number]  Admission Date: 2023  Hospital Length of Stay: 2 days  Discharge Date and Time:  2023 10:27 AM  Attending Physician: Sher Crowley MD   Discharging Provider: Shreya Avendano MD   Primary Care Provider: Enrique Bal Jr., MD    HPI: Janki Hilton is a 38 y.o. [id number] at 22w6d presents complaining of vaginal bleeding and abdominal cramping.  She reports cyclic abdominopelvic pain that began this morning and which was accompanied by moderate spotting.  She says she has gone through two panty liners today both with bright blood and w/o clots.  She describes her abdominopelvic pain and coming and going every 6-8 min and lasting 3 seconds or so.    The patient is POD#2 s/p rescue cerclage placement. At the time of placement the membranes appeared to be bulging at the external os and the os appeared approximately 3 cm dilated.  Membranes were slightly cloudy but they were not opaque and there was no evidence of overt infection or abnormal discharge. A Campa cerclage was successfully placed and the patient was discharged home on POD#1 with PO antibiotics and in stable condition.    FHT: 155 bpm, mod variability, + accels, - decels; overall reassuring and appropriate for gestational age   TOCO:  None     * No surgery found *     Hospital Course:   2023: Patient admitted for close observation due to vaginal bleeding and abdominal cramping in the setting of recent rescue cerclage placement. BMZ administration, tocolytics, and continuous TOCO.   2023: HD#2. Admitted for vaginal bleeding/ abdominal cramping in the setting of cerclage. VSS. TOCO infrequent contractions. Will continue to monitor. Will complete steroid course and tocolytics until through steroid window.   2023: HD#3. NAEO. VSS. EFM/TOCO RR/NO CTX. Will discharge today. MFM in [place] scheduled.     Consults (From admission, onward)         Status Ordering Provider     Inpatient consult to Neonatology  Once        Provider:  (Not yet assigned)    Completed ADELSO RAMON          Final Active Diagnoses:    Diagnosis Date Noted POA    PRINCIPAL PROBLEM:  Vaginal bleeding affecting early pregnancy [O20.9] 09/27/2023 Unknown    Cervical cerclage suture present in second trimester [O34.32] 09/25/2023 Yes    23 weeks gestation of pregnancy [Z3A.22] 09/23/2023 Not Applicable    Multigravida of advanced maternal age in first trimester [O09.521] 08/08/2023 Yes      Problems Resolved During this Admission:    Diagnosis Date Noted Date Resolved POA    Threatened premature labor in second trimester [O47.02] 09/26/2023 09/27/2023 Unknown      Significant Diagnostic Studies: N/A      Feeding Method: N/A    Immunizations     None          This patient has no babies on file.  Pending Diagnostic Studies:     None          Discharged Condition: good    Disposition: Home or Self Care    Follow Up:    Patient Instructions:      Diet Adult Regular     Pelvic Rest   Order Comments: Pelvic rest until follow up visit. Nothing in vagina -no sex, tampons, douching, etc.     No dressing needed     Notify your health care provider if you experience any of the following:  temperature >100.4     Notify your health care provider if you experience any of the following:  persistent nausea and vomiting or diarrhea     Notify your health care provider if you experience any of the following:  severe uncontrolled pain     Notify your health care provider if you experience any of the following:  difficulty breathing or increased cough     Notify your health care provider if you experience any of the following:  severe persistent headache     Notify your health care provider if you experience any of the following:  worsening rash     Notify your health care provider if you experience any of the following:  persistent dizziness, light-headedness, or visual disturbances      Notify your health care provider if you experience any of the following:  increased confusion or weakness     Notify your health care provider if you experience any of the following:   Order Comments: Vaginal bleeding greater than 2 pads per 1 hour for 2 consecutive hours     Activity as tolerated     Medications:  Current Discharge Medication List      CONTINUE these medications which have NOT CHANGED    Details   aspirin (ECOTRIN) 81 MG EC tablet Take 1 tablet (81 mg total) by mouth once daily.  Qty: 30 tablet, Refills: 11    Associated Diagnoses: Multigravida of advanced maternal age in first trimester; History of multiple miscarriages      azithromycin (ZITHROMAX) 500 MG tablet Take 1 tablet (500 mg total) by mouth 2 (two) times a day. for 9 days  Qty: 18 tablet, Refills: 0      cephALEXin (KEFLEX) 500 MG capsule Take 1 capsule (500 mg total) by mouth every 8 (eight) hours. for 9 days  Qty: 27 capsule, Refills: 0      metroNIDAZOLE (FLAGYL) 500 MG tablet Take 1 tablet (500 mg total) by mouth every 12 (twelve) hours. for 9 days  Qty: 18 tablet, Refills: 0      PNV,calcium 72-iron-folic acid (PRENATAL VITAMIN PLUS LOW IRON) 27 mg iron- 1 mg Tab Take one tablet daily.  Prescribe prenatal covered by insurance  Qty: 90 tablet, Refills: 11    Associated Diagnoses: Initial obstetric visit in first trimester      prenatal vit-iron fum-folic ac 65 mg iron- 1 mg Tab Take by mouth.         STOP taking these medications       NIFEdipine (PROCARDIA) 10 MG Cap Comments:   Reason for Stopping:               Shreya Avendano MD  Obstetrics  [facility] - Antepartum

## 2023-09-28 NOTE — NURSING
Report received from MICHELE Walters. Care of pt assumed. Anesthesia consent signed, witnessed, and in Epic. Delivery and blood consents signed, witnessed, and in paper chart.    Pt feeling much better; denies ctx, LOF, states she has had no bleeding or spotting since yesterday. +FM.     Pt declines flu shot before discharge.    Pt being discharged. PIV removed. Discharge instructions/return precautions reviewed with pt. Pt verbalized understanding. Pt driving to [place] today; copy of discharge summary given to pt should she need to present to the hospital between now and her arrival in [place]. She also has access to [employer]. Pt has follow up appointment scheduled in [place].    Pt wheeled off unit via wheelchair by her . Care of pt relinquished.    Alejandra Meléndez RN

## 2023-09-28 NOTE — CARE UPDATE
PM NST     FHT: 150 baseline, +accel, + occasional variable decel, mod variability     Harts: No contractions     NST reactive and reassuring for gestational age.      Plan: F/u with AM NST.    Marichuy Macias MD PGY-2  Obstetrics and Gynecology

## 2023-09-28 NOTE — SUBJECTIVE & OBJECTIVE
Obstetric HPI:  This pregnancy has been complicated by recurrent pregnancy loss, AMA, h/o  delivery in previous pregnancy (22w, endorses contractions followed by cervical change and subsequent delivery).    Denies vaginal bleeding, contractions, abdominal pain, no leakage of fluids. Reports flutter for fetal movement.     OB History    Para Term  AB Living   5 1 0 1 3 0   SAB IAB Ectopic Multiple Live Births   3 0 0 0 0      # Outcome Date GA Lbr Harpal/2nd Weight Sex Delivery Anes PTL Lv   5 Current            4 SAB            3 SAB            2 SAB            1   22w0d            No past medical history on file.  Past Surgical History:   Procedure Laterality Date    CERVICAL CERCLAGE N/A 2023    Procedure: CERCLAGE, CERVIX;  Surgeon: Torrey Jain MD;  [facility]: [facility];  Service: OB/GYN;  Laterality: N/A;       PTA Medications   Medication Sig    aspirin (ECOTRIN) 81 MG EC tablet Take 1 tablet (81 mg total) by mouth once daily.    azithromycin (ZITHROMAX) 500 MG tablet Take 1 tablet (500 mg total) by mouth 2 (two) times a day. for 9 days    cephALEXin (KEFLEX) 500 MG capsule Take 1 capsule (500 mg total) by mouth every 8 (eight) hours. for 9 days    metroNIDAZOLE (FLAGYL) 500 MG tablet Take 1 tablet (500 mg total) by mouth every 12 (twelve) hours. for 9 days    NIFEdipine (PROCARDIA) 10 MG Cap Take 1 capsule (10 mg total) by mouth every 4 (four) hours.    PNV,calcium 72-iron-folic acid (PRENATAL VITAMIN PLUS LOW IRON) 27 mg iron- 1 mg Tab Take one tablet daily.  Prescribe prenatal covered by insurance    prenatal vit-iron fum-folic ac 65 mg iron- 1 mg Tab Take by mouth.       Review of patient's allergies indicates:  No Known Allergies     Family History    None       Tobacco Use    Smoking status: Never    Smokeless tobacco: Never   Substance and Sexual Activity    Alcohol use: Yes     Comment: occasional     Drug use: Never    Sexual activity: Yes     Partners: Male     Birth  control/protection: None     Review of Systems   Constitutional:  Negative for chills and fever.   Respiratory:  Negative for shortness of breath.    Cardiovascular:  Negative for chest pain.   Gastrointestinal:  Negative for abdominal pain, nausea and vomiting.   Endocrine: Negative for hot flashes.   Genitourinary:  Negative for dysuria, pelvic pain and vaginal bleeding.   Integumentary:  Negative for breast mass, nipple discharge and breast skin changes.   Neurological:  Negative for headaches.   Hematological:  Does not bruise/bleed easily.   Psychiatric/Behavioral:  Negative for depression.    Breast: Negative for mass, mastodynia, nipple discharge and skin changes     Objective:     Vital Signs (Most Recent):  Temp: 97.6 °F (36.4 °C) (09/28/23 0820)  Pulse: 85 (09/28/23 0826)  Resp: 18 (09/28/23 0820)  BP: (!) 112/59 (09/28/23 0820)  SpO2: 99 % (09/28/23 0823) Vital Signs (24h Range):  Temp:  [97.6 °F (36.4 °C)-98.6 °F (37 °C)] 97.6 °F (36.4 °C)  Pulse:  [82-89] 85  Resp:  [16-18] 18  SpO2:  [99 %-100 %] 99 %  BP: (112-126)/(55-73) 112/59     Weight: 75.3 kg (166 lb)  Body mass index is 27.62 kg/m².    FHT: 155 bpm, mod variability - accels, - decels; overall reassuring and appropriate for gestational age   TOCO:  None      Physical Exam:   Constitutional: She is oriented to person, place, and time. She appears well-developed and well-nourished. No distress.    HENT:   Head: Normocephalic and atraumatic.    Eyes: Pupils are equal, round, and reactive to light. Conjunctivae are normal.     Cardiovascular:  Normal rate.             Pulmonary/Chest: Effort normal. No respiratory distress.        Abdominal: Soft. Bowel sounds are normal. She exhibits no distension. There is no abdominal tenderness. There is no rebound and no guarding.     Genitourinary:    Vagina normal.   No  no vaginal discharge in the vagina.           Musculoskeletal: Normal range of motion.       Neurological: She is alert and oriented to  person, place, and time.    Skin: Skin is warm and dry.    Psychiatric: She has a normal mood and affect. Her behavior is normal. Judgment and thought content normal.        Cervix: (09/26)  Dilation:  External os closed  Effacement:  50%  Station: -3  Presentation: Breech     Significant Labs:  Lab Results   Component Value Date    GROUPTRH O POS 09/26/2023    HEPBSAG Non-reactive 05/29/2023       CBC:   Recent Labs   Lab 09/26/23  1821   WBC 15.04*   RBC 4.19   HGB 12.3   HCT 36.8*      MCV 88   MCH 29.4   MCHC 33.4       I have personallly reviewed all pertinent lab results from the last 24 hours.

## 2023-09-28 NOTE — ASSESSMENT & PLAN NOTE
- POD#5 s/p Campa cerclage placement  - Stable, will leave cerclage in place   - Patient to follow-up with MFM in [place] on 10/04/2023. Return precautions given.

## 2023-09-28 NOTE — PROGRESS NOTES
[facility] - Antepartum  Obstetrics  Antepartum Progress Note    Patient Name: Janki Hilton  MRN: [id number]  Admission Date: 2023  Hospital Length of Stay: 2 days  Attending Physician: Sher Crowley MD  Primary Care Provider: Enrique Bal Jr., MD    Subjective:     Principal Problem:Vaginal bleeding affecting early pregnancy    HPI:  Janki Hilton is a 38 y.o. [id number] at 22w6d presents complaining of vaginal bleeding and abdominal cramping.  She reports cyclic abdominopelvic pain that began this morning and which was accompanied by moderate spotting.  She says she has gone through two panty liners today both with bright blood and w/o clots.  She describes her abdominopelvic pain and coming and going every 6-8 min and lasting 3 seconds or so.    The patient is POD#2 s/p rescue cerclage placement. At the time of placement the membranes appeared to be bulging at the external os and the os appeared approximately 3 cm dilated.  Membranes were slightly cloudy but they were not opaque and there was no evidence of overt infection or abnormal discharge. A Campa cerclage was successfully placed and the patient was discharged home on POD#1 with PO antibiotics and in stable condition.    Hospital Course:  2023: Patient admitted for close observation due to vaginal bleeding and abdominal cramping in the setting of recent rescue cerclage placement. BMZ administration, tocolytics, and continuous TOCO.   2023: HD#2. Admitted for vaginal bleeding/ abdominal cramping in the setting of cerclage. VSS. TOCO infrequent contractions. Will continue to monitor. Will complete steroid course and tocolytics until through steroid window.   2023: HD#3. NAEO. VSS. EFM/TOCO RR/NO CTX. Will discharge today. MFM in [place] scheduled.     Obstetric HPI:  This pregnancy has been complicated by recurrent pregnancy loss, AMA, h/o  delivery in previous pregnancy (22w, endorses contractions followed by cervical change and  subsequent delivery).    Denies vaginal bleeding, contractions, abdominal pain, no leakage of fluids. Reports flutter for fetal movement.     OB History    Para Term  AB Living   5 1 0 1 3 0   SAB IAB Ectopic Multiple Live Births   3 0 0 0 0      # Outcome Date GA Lbr Harpal/2nd Weight Sex Delivery Anes PTL Lv   5 Current            4 SAB            3 SAB            2 SAB            1   22w0d            No past medical history on file.  Past Surgical History:   Procedure Laterality Date    CERVICAL CERCLAGE N/A 2023    Procedure: CERCLAGE, CERVIX;  Surgeon: Torrey Jain MD;  Location: [facility] L&D;  Service: OB/GYN;  Laterality: N/A;       PTA Medications   Medication Sig    aspirin (ECOTRIN) 81 MG EC tablet Take 1 tablet (81 mg total) by mouth once daily.    azithromycin (ZITHROMAX) 500 MG tablet Take 1 tablet (500 mg total) by mouth 2 (two) times a day. for 9 days    cephALEXin (KEFLEX) 500 MG capsule Take 1 capsule (500 mg total) by mouth every 8 (eight) hours. for 9 days    metroNIDAZOLE (FLAGYL) 500 MG tablet Take 1 tablet (500 mg total) by mouth every 12 (twelve) hours. for 9 days    NIFEdipine (PROCARDIA) 10 MG Cap Take 1 capsule (10 mg total) by mouth every 4 (four) hours.    PNV,calcium 72-iron-folic acid (PRENATAL VITAMIN PLUS LOW IRON) 27 mg iron- 1 mg Tab Take one tablet daily.  Prescribe prenatal covered by insurance    prenatal vit-iron fum-folic ac 65 mg iron- 1 mg Tab Take by mouth.       Review of patient's allergies indicates:  No Known Allergies     Family History    None       Tobacco Use    Smoking status: Never    Smokeless tobacco: Never   Substance and Sexual Activity    Alcohol use: Yes     Comment: occasional     Drug use: Never    Sexual activity: Yes     Partners: Male     Birth control/protection: None     Review of Systems   Constitutional:  Negative for chills and fever.   Respiratory:  Negative for shortness of breath.    Cardiovascular:  Negative  for chest pain.   Gastrointestinal:  Negative for abdominal pain, nausea and vomiting.   Endocrine: Negative for hot flashes.   Genitourinary:  Negative for dysuria, pelvic pain and vaginal bleeding.   Integumentary:  Negative for breast mass, nipple discharge and breast skin changes.   Neurological:  Negative for headaches.   Hematological:  Does not bruise/bleed easily.   Psychiatric/Behavioral:  Negative for depression.    Breast: Negative for mass, mastodynia, nipple discharge and skin changes     Objective:     Vital Signs (Most Recent):  Temp: 97.6 °F (36.4 °C) (09/28/23 0820)  Pulse: 85 (09/28/23 0826)  Resp: 18 (09/28/23 0820)  BP: (!) 112/59 (09/28/23 0820)  SpO2: 99 % (09/28/23 0823) Vital Signs (24h Range):  Temp:  [97.6 °F (36.4 °C)-98.6 °F (37 °C)] 97.6 °F (36.4 °C)  Pulse:  [82-89] 85  Resp:  [16-18] 18  SpO2:  [99 %-100 %] 99 %  BP: (112-126)/(55-73) 112/59     Weight: 75.3 kg (166 lb)  Body mass index is 27.62 kg/m².    FHT: 155 bpm, mod variability - accels, - decels; overall reassuring and appropriate for gestational age   TOCO:  None      Physical Exam:   Constitutional: She is oriented to person, place, and time. She appears well-developed and well-nourished. No distress.    HENT:   Head: Normocephalic and atraumatic.    Eyes: Pupils are equal, round, and reactive to light. Conjunctivae are normal.     Cardiovascular:  Normal rate.             Pulmonary/Chest: Effort normal. No respiratory distress.      Abdominal: Soft. Bowel sounds are normal. She exhibits no distension. There is no abdominal tenderness. There is no rebound and no guarding.     Genitourinary:    Vagina normal.   No  no vaginal discharge in the vagina.           Musculoskeletal: Normal range of motion.       Neurological: She is alert and oriented to person, place, and time.    Skin: Skin is warm and dry.    Psychiatric: She has a normal mood and affect. Her behavior is normal. Judgment and thought content normal.     Cervix:  ()  Dilation:  External os closed  Effacement:  50%  Station: -3  Presentation: Breech     Significant Labs:  Lab Results   Component Value Date    GROUPTRH O POS 2023    HEPBSAG Non-reactive 2023       CBC:   Recent Labs   Lab 23  1821   WBC 15.04*   RBC 4.19   HGB 12.3   HCT 36.8*      MCV 88   MCH 29.4   MCHC 33.4       I have personallly reviewed all pertinent lab results from the last 24 hours.    Assessment/Plan:     38 y.o. female  at 23w1d for:    * Vaginal bleeding affecting early pregnancy  - NAEO  - CBC stable   - Multiple cervical exam stable findings     Cervical cerclage suture present in second trimester  - POD#5 s/p Campa cerclage placement  - Stable, will leave cerclage in place   - Patient to follow-up with MFM in [place] on 10/04/2023. Return precautions given.     23 weeks gestation of pregnancy  - 22w6d gestation on admit  - Highly desired pregnancy and patient wishes for all interventions to stop bleeding/labor at this time  - Growth scan performed on admit with EFW [id number], breech presentation  - Labor and delivery consent forms reviewed and signed on admit  - Betamethasone course with indocin for tocolysis during steroid window; once completed, if patient stable will discharge    Multigravida of advanced maternal age in first trimester  - cfDNA negative    Shreya Avendano MD  Obstetrics  [facility] - Antepartum

## 2023-09-28 NOTE — DISCHARGE INSTRUCTIONS
Call the clinic at [phone] or L & D after hours at [phone] for vaginal bleeding, leakage of fluids, contractions 4-5 in one hour, decreased fetal movements (less than 10 kicks in 2 hours), headache not relieved by Tylenol, blurry vision, or temp of 100.4 or greater.  Begin doing fetal kick counts, at least 10 movements in 2 hours starting at 28 weeks gestation.  Keep next clinic appointment.

## 2023-09-28 NOTE — ASSESSMENT & PLAN NOTE
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

## 2023-10-04 ENCOUNTER — PATIENT MESSAGE (OUTPATIENT)
Dept: OTHER | Facility: OTHER | Age: 38
End: 2023-10-04
Payer: OTHER GOVERNMENT

## 2023-10-18 ENCOUNTER — PATIENT MESSAGE (OUTPATIENT)
Dept: OTHER | Facility: OTHER | Age: 38
End: 2023-10-18
Payer: OTHER GOVERNMENT

## 2023-11-01 ENCOUNTER — PATIENT MESSAGE (OUTPATIENT)
Dept: OTHER | Facility: OTHER | Age: 38
End: 2023-11-01
Payer: OTHER GOVERNMENT

## 2023-12-20 ENCOUNTER — PATIENT MESSAGE (OUTPATIENT)
Dept: OTHER | Facility: OTHER | Age: 38
End: 2023-12-20
Payer: OTHER GOVERNMENT

## 2024-06-05 DIAGNOSIS — Z12.31 OTHER SCREENING MAMMOGRAM: ICD-10-CM
